# Patient Record
Sex: MALE | Race: BLACK OR AFRICAN AMERICAN | NOT HISPANIC OR LATINO | Employment: UNEMPLOYED | URBAN - METROPOLITAN AREA
[De-identification: names, ages, dates, MRNs, and addresses within clinical notes are randomized per-mention and may not be internally consistent; named-entity substitution may affect disease eponyms.]

---

## 2020-01-07 ENCOUNTER — HOSPITAL ENCOUNTER (OUTPATIENT)
Dept: OCCUPATIONAL THERAPY | Facility: HOSPITAL | Age: 40
End: 2020-01-08
Attending: INTERNAL MEDICINE | Admitting: INTERNAL MEDICINE

## 2020-01-07 LAB
ABS NEUT (OLG): 6.09 X10(3)/MCL (ref 2.1–9.2)
ALBUMIN SERPL-MCNC: 3.9 GM/DL (ref 3.4–5)
ALBUMIN/GLOB SERPL: 1 {RATIO}
ALP SERPL-CCNC: 92 UNIT/L (ref 45–117)
ALT SERPL-CCNC: 29 UNIT/L (ref 16–61)
AMPHET UR QL SCN: NEGATIVE
APPEARANCE, UA: NORMAL
APTT PPP: 31.3 SEC (ref 23.4–34.9)
AST SERPL-CCNC: 26 UNIT/L (ref 15–37)
BACTERIA SPEC CULT: ABNORMAL /HPF
BARBITURATE SCN PRESENT UR: NEGATIVE
BASOPHILS # BLD AUTO: 0.06 X10(3)/MCL (ref 0–0.2)
BASOPHILS NFR BLD AUTO: 0.6 % (ref 0–0.9)
BENZODIAZ UR QL SCN: ABNORMAL
BILIRUB SERPL-MCNC: 0.2 MG/DL (ref 0.2–1)
BILIRUB UR QL STRIP: NORMAL
BILIRUBIN DIRECT+TOT PNL SERPL-MCNC: 0.1 MG/DL (ref 0–0.2)
BILIRUBIN DIRECT+TOT PNL SERPL-MCNC: 0.1 MG/DL (ref 0–1)
BUN SERPL-MCNC: 6 MG/DL (ref 7–18)
CALCIUM SERPL-MCNC: 9 MG/DL (ref 8.5–10.1)
CANNABINOIDS UR QL SCN: ABNORMAL
CHLORIDE SERPL-SCNC: 108 MMOL/L (ref 98–107)
CO2 SERPL-SCNC: 25 MMOL/L (ref 21–32)
COCAINE UR QL SCN: NEGATIVE
COLOR UR: NORMAL
CREAT SERPL-MCNC: 0.86 MG/DL (ref 0.7–1.3)
EOSINOPHIL # BLD AUTO: 0.25 X10(3)/MCL (ref 0–0.9)
EOSINOPHIL NFR BLD AUTO: 2.4 % (ref 0–6.5)
ERYTHROCYTE [DISTWIDTH] IN BLOOD BY AUTOMATED COUNT: 14.5 % (ref 11.5–17)
GLOBULIN SER-MCNC: 4 GM/DL (ref 2–4)
GLUCOSE (UA): NORMAL
GLUCOSE SERPL-MCNC: 88 MG/DL (ref 74–106)
HCT VFR BLD AUTO: 41.5 % (ref 42–52)
HGB BLD-MCNC: 14 GM/DL (ref 14–18)
HGB UR QL STRIP: NORMAL
IMM GRANULOCYTES # BLD AUTO: 0.05 10*3/UL (ref 0–0.02)
IMM GRANULOCYTES NFR BLD AUTO: 0.5 % (ref 0–0.43)
INR PPP: 1 (ref 2–3)
KETONES UR QL STRIP: NORMAL
LEUKOCYTE ESTERASE UR QL STRIP: NORMAL
LYMPHOCYTES # BLD AUTO: 3.04 X10(3)/MCL (ref 0.6–4.6)
LYMPHOCYTES NFR BLD AUTO: 28.7 % (ref 16.2–38.3)
MCH RBC QN AUTO: 27 PG (ref 27–31)
MCHC RBC AUTO-ENTMCNC: 33.7 GM/DL (ref 33–36)
MCV RBC AUTO: 80 FL (ref 80–94)
METHADONE UR QL SCN: NEGATIVE
MONOCYTES # BLD AUTO: 1.09 X10(3)/MCL (ref 0.1–1.3)
MONOCYTES NFR BLD AUTO: 10.3 % (ref 4.7–11.3)
NEUTROPHILS # BLD AUTO: 6.09 X10(3)/MCL (ref 2.1–9.2)
NEUTROPHILS NFR BLD AUTO: 57.5 % (ref 49.1–73.4)
NITRITE UR QL STRIP: NORMAL
NRBC BLD AUTO-RTO: 0 % (ref 0–0.2)
OPIATES UR QL SCN: NEGATIVE
PCP UR QL: NEGATIVE
PH UR STRIP.AUTO: 5 [PH] (ref 5–7.5)
PH UR STRIP: NORMAL [PH] (ref 5–7)
PLATELET # BLD AUTO: 281 X10(3)/MCL (ref 130–400)
PMV BLD AUTO: 12.2 FL (ref 7.4–10.4)
POTASSIUM SERPL-SCNC: 3.4 MMOL/L (ref 3.5–5.1)
PROT SERPL-MCNC: 8.4 GM/DL (ref 6.4–8.2)
PROT UR QL STRIP: NORMAL
PROTHROMBIN TIME: 12.6 SEC (ref 11.7–14.5)
RBC # BLD AUTO: 5.19 X10(6)/MCL (ref 4.7–6.1)
RBC #/AREA URNS HPF: ABNORMAL /HPF
SODIUM SERPL-SCNC: 139 MMOL/L (ref 136–145)
SP GR UR STRIP: NORMAL (ref 1–1.03)
SQUAMOUS EPITHELIAL, UA: ABNORMAL /LPF
UROBILINOGEN UR STRIP-ACNC: NORMAL
WBC # SPEC AUTO: 10.6 X10(3)/MCL (ref 4.5–11.5)
WBC #/AREA URNS HPF: ABNORMAL /HPF

## 2020-01-08 LAB
ABS NEUT (OLG): 6.97 X10(3)/MCL (ref 2.1–9.2)
APTT PPP: 33 SECOND(S) (ref 24.2–33.9)
BASOPHILS # BLD AUTO: 0.1 X10(3)/MCL (ref 0–0.2)
BASOPHILS NFR BLD AUTO: 1 %
BUN SERPL-MCNC: 7 MG/DL (ref 7–18)
CALCIUM SERPL-MCNC: 8.6 MG/DL (ref 8.5–10.1)
CHLORIDE SERPL-SCNC: 110 MMOL/L (ref 98–107)
CO2 SERPL-SCNC: 26 MMOL/L (ref 21–32)
CREAT SERPL-MCNC: 0.83 MG/DL (ref 0.7–1.3)
CREAT/UREA NIT SERPL: 8.4
EOSINOPHIL # BLD AUTO: 0.3 X10(3)/MCL (ref 0–0.9)
EOSINOPHIL NFR BLD AUTO: 3 %
ERYTHROCYTE [DISTWIDTH] IN BLOOD BY AUTOMATED COUNT: 14.9 % (ref 11.5–17)
GLUCOSE SERPL-MCNC: 92 MG/DL (ref 74–106)
HCT VFR BLD AUTO: 41 % (ref 42–52)
HGB BLD-MCNC: 13.2 GM/DL (ref 14–18)
IMM GRANULOCYTES # BLD AUTO: 0 10*3/UL
IMM GRANULOCYTES NFR BLD AUTO: 1 %
INR PPP: 0.9 (ref 0–1.3)
LYMPHOCYTES # BLD AUTO: 2.1 X10(3)/MCL (ref 0.6–4.6)
LYMPHOCYTES NFR BLD AUTO: 20 %
MCH RBC QN AUTO: 26.6 PG (ref 27–31)
MCHC RBC AUTO-ENTMCNC: 32.2 GM/DL (ref 33–36)
MCV RBC AUTO: 82.5 FL (ref 80–94)
MONOCYTES # BLD AUTO: 1.1 X10(3)/MCL (ref 0.1–1.3)
MONOCYTES NFR BLD AUTO: 10 %
NEUTROPHILS # BLD AUTO: 6.97 X10(3)/MCL (ref 2.1–9.2)
NEUTROPHILS NFR BLD AUTO: 66 %
PLATELET # BLD AUTO: 273 X10(3)/MCL (ref 130–400)
PMV BLD AUTO: 11.8 FL (ref 9.4–12.4)
POTASSIUM SERPL-SCNC: 4 MMOL/L (ref 3.5–5.1)
PROTHROMBIN TIME: 12.6 SECOND(S) (ref 12–14)
RBC # BLD AUTO: 4.97 X10(6)/MCL (ref 4.7–6.1)
SODIUM SERPL-SCNC: 140 MMOL/L (ref 136–145)
WBC # SPEC AUTO: 10.6 X10(3)/MCL (ref 4.5–11.5)

## 2020-07-19 ENCOUNTER — HISTORICAL (OUTPATIENT)
Dept: ADMINISTRATIVE | Facility: HOSPITAL | Age: 40
End: 2020-07-19

## 2021-03-28 ENCOUNTER — HISTORICAL (OUTPATIENT)
Dept: ADMINISTRATIVE | Facility: HOSPITAL | Age: 41
End: 2021-03-28

## 2021-04-16 ENCOUNTER — HISTORICAL (OUTPATIENT)
Dept: ADMINISTRATIVE | Facility: HOSPITAL | Age: 41
End: 2021-04-16

## 2021-11-20 ENCOUNTER — HISTORICAL (OUTPATIENT)
Dept: ADMINISTRATIVE | Facility: HOSPITAL | Age: 41
End: 2021-11-20

## 2022-04-10 ENCOUNTER — HISTORICAL (OUTPATIENT)
Dept: ADMINISTRATIVE | Facility: HOSPITAL | Age: 42
End: 2022-04-10

## 2022-04-29 VITALS
BODY MASS INDEX: 25.79 KG/M2 | DIASTOLIC BLOOD PRESSURE: 98 MMHG | WEIGHT: 170.19 LBS | OXYGEN SATURATION: 98 % | BODY MASS INDEX: 25.79 KG/M2 | HEIGHT: 68 IN | WEIGHT: 170.19 LBS | HEIGHT: 68 IN | SYSTOLIC BLOOD PRESSURE: 158 MMHG

## 2022-04-30 NOTE — ED PROVIDER NOTES
Patient:   Obinna Burton             MRN: [id number]            FIN: [id number]               Age:   39 years     Sex:  Male     :  1980   Associated Diagnoses:   Hematuria, gross; H/O hemophilia B   Author:   Ayana Ribera MD      Basic Information   Additional information: Chief Complaint from Nursing Triage Note : Chief Complaint   2020 18:19 CST       Chief Complaint           c/o abdominal discomfort since this am accompanied by bloody urine since yesterday, pt is hemophilia B pt, states took factor medication x2 today  (Modified) .      History of Present Illness   The patient presents with hematuria, Mr. Senegal is a 39-year-old -American male with a history of hemophilia B and hypertension complaining of hematuria which started yesterday.  Today he developed suprapubic aching, cramping sensations and is now urinating blood with small blood clots.  He took 2 doses of his factor IX product today but the bleeding did not resolve.  He has no fever, dysuria, penile discharge, history of UTI, history of STD.  He has had multiple complications due to his hemophilia over the years including recurrent episodes of hemarthrosis and also a previous episode of intra-abdominal bleeding which required surgery and has a large midline scar.  However, he's not had any complications for the last few years.  He usually takes his factor IX shot twice a week on Monday and Thursday, 6000 units, but took 2 doses today due to the continued urinary bleeding..  The onset was 2  days ago.  The course/duration of symptoms is constant.  Character of hematuria grossly bloody.  The degree at onset was moderate.  The degree at present is severe.  The exacerbating factor is none.  The relieving factor is none.  Prior episodes: rare.  Therapy today: Factor 9 X two doses today.  Associated symptoms: abdominal pain.        Review of Systems   Gastrointestinal symptoms:  Abdominal pain.   Genitourinary symptoms:   Hematuria.             Additional review of systems information: All other systems reviewed and otherwise negative.      Health Status   Allergies:    Allergic Reactions (Selected)  Severity Not Documented  Acetaminophen- Thinins blood.  Aspirin- Other.,    Allergies (2) Active Reaction  acetaminophen Thinins blood  aspirin Other  .   Medications:  (Selected)   Prescriptions  Prescribed  Remeron 30 mg oral tablet: 30 mg = 1 tab(s), Oral, Once a day (at bedtime), # 30 tab(s), 0 Refill(s), Pharmacy: [facility]  amlodipine 5 mg oral tablet: 5 mg = 1 tab(s), Oral, Daily, # 30 tab(s), 5 Refill(s), Pharmacy: [facility]  valsartan 160 mg oral tablet: 0.5 tab, Oral, Daily, # 30 tab(s), 5 Refill(s), Pharmacy: [facility]  Documented Medications  Documented  BeneFIX intravenous kit:   BeneFIX intravenous kit:   azithromycin 250 mg oral tablet: Oral  cetirizine 10 mg oral tablet: 10 mg = 1 tab(s), Oral, Daily  diclofenac sodium 75 mg oral delayed release tablet: 75 mg = 1 tab(s), Oral, BID  miSOPROStol 200 mcg oral tablet: 200 mcg = 1 tab(s), Oral, BID  ranitidine 150 mg oral tablet: 150 mg = 1 tab(s), Oral, BID  traZODone 100 mg oral tablet: 100 mg = 1 tab(s), Oral, qPM.      Past Medical/ Family/ Social History   Medical history: Negative.   Surgical history:    Knee ([id number]).  Comments:  12/18/2019 14:52 CST - Babita Pereira  both knees  Stomach ([id number]).  Comments:  12/18/2019 14:52 CST - Babita Pereira  removed blood clot, Reviewed as documented in chart.   Family history:    Hypertension.  Father  Mother  , Reviewed as documented in chart.   Social history: Tobacco use: Denies.   Problem list:    Active Problems (7)  Anxiety   GERD - Gastro-esophageal reflux disease   Hemophilia   HTN - Hypertension   Insomnia   Obesity   Seasonal allergy   , per nurse's notes.      Physical Examination               Vital Signs   Vital Signs   1/7/2020 18:19 CST       Temperature Temporal Artery               36.9 DegC                              Peripheral Pulse Rate     88 bpm                             Respiratory Rate          18 br/min                             SpO2                      99 %                             Oxygen Therapy            Room air                             Systolic Blood Pressure   121 mmHg                             Diastolic Blood Pressure  78 mmHg  .   Measurements   1/7/2020 18:19 CST       Weight Dosing             94 kg                             Weight Measured and Calculated in Lbs     207.23 lb                             Weight Estimated          94 kg                             Height/Length Dosing      172 cm                             Height/Length Estimated   172 cm                             Body Mass Index Estimated 31.77 kg/m2  .   Oxygen saturation.   General:  Alert, no acute distress.    Skin:  Warm, dry.    Eye:  Pupils are equal, round and reactive to light.   Cardiovascular:  Regular rate and rhythm.   Respiratory:  Lungs are clear to auscultation.   Gastrointestinal:  Soft, Nontender, Non distended, Normal bowel sounds, No organomegaly, Mass: Negative.    Musculoskeletal:  Ambulatory without assistance.   Neurological:  Alert and oriented to person, place, time, and situation, No focal neurological deficit observed.    Psychiatric:  Cooperative, appropriate mood & affect.       Medical Decision Making   Documents reviewed:  Emergency department nurses' notes.   Results review:  Lab results : Lab View   1/7/2020 18:38 CST       Sodium Lvl                139 mmol/L                             Potassium Lvl             3.4 mmol/L  LOW                             Chloride                  108 mmol/L  HI                             CO2                       25.0 mmol/L                             Calcium Lvl               9.0 mg/dL                             Glucose Lvl               88 mg/dL                             BUN                       6.0 mg/dL  LOW                              Creatinine                0.86 mg/dL                             eGFR-AA                   >60 mL/min/1.73 m2  NA                             eGFR-CONSTANZA                  >60 mL/min/1.73 m2  NA                             Bili Total                0.2 mg/dL                             Bili Direct               0.10 mg/dL                             Bili Indirect             0.10 mg/dL                             AST                       26 unit/L                             ALT                       29 unit/L                             Alk Phos                  92 unit/L                             Total Protein             8.4 gm/dL  HI                             Albumin Lvl               3.9 gm/dL                             Globulin                  4 gm/dL                             A/G Ratio                 1.0  NA                             PT                        12.6 sec                             INR                       1.0  LOW                             WBC                       10.6 x10(3)/mcL                             RBC                       5.19 x10(6)/mcL                             Hgb                       14.0 gm/dL                             Hct                       41.5 %  LOW                             Platelet                  281 x10(3)/mcL                             MCV                       80.0 fL                             MCH                       27.0 pg                             MCHC                      33.7 gm/dL                             RDW                       14.5 %                             MPV                       12.2 fL  HI                             Abs Neut                  6.09 x10(3)/mcL                             Neutro Auto               57.5 %                             Lymph Auto                28.7 %                             Mono Auto                 10.3 %                             Eos Auto                  2.4 %                              Abs Eos                   0.25 x10(3)/mcL                             Basophil Auto             0.6 %                             Abs Neutro                6.09 x10(3)/mcL                             Abs Lymph                 3.04 x10(3)/mcL                             Abs Mono                  1.09 x10(3)/mcL                             Abs Baso                  0.06 x10(3)/mcL                             NRBC%                     0.0 %                             IG%                       0.500 %  HI                             IG#                       0.0500  HI    1/7/2020 18:23 CST       U pH                      5.0                             UA Appear                 CLOUDY                             UA Color                  RED                             UA Spec Grav              NP                             UA Bili                   NP                             UA pH                     NP                             UA Urobilinogen           NP                             UA Blood                  NP                             UA Glucose                NP                             UA Ketones                NP                             UA Protein                NP                             UA Nitrite                NP                             UA Leuk Est               NP                             UA WBC                    0-1 /HPF                             UA RBC                    TNTC /HPF                             UA Bacteria               Few /HPF                             UA Squam Epithelial       Moderate /LPF                             U Amph Scr                Negative                             U Pau Scr                Negative                             U Benzodia Scr            POS, Unconfirmed                             U Cannab Scr              POS, Unconfirmed                             U Cocaine Scr             Negative                              U Opiate Scr              Negative                             U Phencyclidine Scr       Negative                             U Methadone               Negative  .   Radiology results:  Rad Results (ST)  < 12 hrs   Accession: [id number]  Order: CT Abdomen and Pelvis W Contrast  Report Dt/Tm: 01/07/2020 20:05  Report:   CT of the abdomen pelvis with contrast     [id number]     INDICATION: Blood in urine     TECHNIQUE: Routine CT of the abdomen pelvis performed with intravenous  contrast.     Automatic exposure control was utilized to reduce patient's radiation  dose.     Total DLP: 1212.2 mGy.cm      FINDINGS: The visualized lung bases are clear.     The spleen, pancreas, contracted gallbladder, and adrenal glands  unremarkable. The liver demonstrates focal fatty infiltration in the  medial left hepatic lobe. The abdominal aorta is normal caliber. Both  kidneys enhance normally. No hydronephrosis or perinephric collection.     The appendix appears normal. There is no bowel obstruction or bowel  inflammation. Bladder contours are normal. No free air, free fluid,  fluid collection.     IMPRESSION: No acute intra-abdominal intrapelvic abnormality.      .      Reexamination/ Reevaluation   Time: 1/7/2020 20:35:00 .   Vital signs   Basic Oxygen Information   1/7/2020 18:19 CST       SpO2                      99 %                             Oxygen Therapy            Room air     Notes: Labs and CT scan are benign, last urinated red bloody urine with small clots, states he still having suprapubic cramping..      Impression and Plan   Diagnosis   Hematuria, gross (FSI50-SR R31.0)   H/O hemophilia B ([id number] Z86.2)      Calls-Consults   -  Case discussed with [facility] nurse practitioner.  She recommends we place him in outpatient observation to Dr. Perez, and if he is continuing to bleed by the morning, hematology will be consulted for further evaluation and management..   Plan   Condition: Stable.    Disposition: Admit  time  1/7/2020 21:03:00, Place in Observation Unit.    Counseled: Patient, Regarding diagnosis, Regarding diagnostic results, Regarding treatment plan, Patient indicated understanding of instructions.

## 2022-04-30 NOTE — DISCHARGE SUMMARY
Patient:   Obinna Burton             MRN: [id number]            FIN: [id number]               Age:   39 years     Sex:  Male     :  1980   Associated Diagnoses:   None   Author:   Paulino Salomon MD      Discharge Information      Discharge Summary Information   Admit/Discharge Dates   Admit Date: 2020  Discharge Date: 2020     Physicians   Attending Physician - Chris HIDALGO, Geraldo PLAZA  Admitting Physician - Chris HIDALGO, Geraldo PLAZA  Consulting Physician - Chris HIDALGO, Geraldo PLAZA  Consulting Physician - Aime HIDALGO, Paulino  Primary Care Physician - Sari Hall     Discharge Diagnosis   Spontaneous lola hematuria: resolved   Hemophilia B  Essential hypertension     Procedures   No procedures recorded for this visit.   Immunizations   No immunizations recorded for this visit.     Discharge Medications   Continue  amLODIPine (amlodipine 5 mg oral tablet) 5 mg, Oral, Daily  coagulation factor IX (BeneFIX intravenous kit)  coagulation factor IX (BeneFIX intravenous kit)  mirtazapine (Remeron 30 mg oral tablet) 30 mg, Oral, Once a day (at bedtime)  valsartan (valsartan 160 mg oral tablet) 0.5 tab, Oral, Daily  Discontinue  azithromycin (azithromycin 250 mg oral tablet), Oral  cetirizine (cetirizine 10 mg oral tablet) 10 mg, Oral, Daily  diclofenac (diclofenac sodium 75 mg oral delayed release tablet) 75 mg, Oral, BID  misoprostol (miSOPROStol 200 mcg oral tablet) 200 mcg, Oral, BID  ranitidine (ranitidine 150 mg oral tablet) 150 mg, Oral, BID  traZODone (traZODone 100 mg oral tablet) 100 mg, Oral, qPM        Education   Hemophilia, Adult  Discharge - 20 8:02:00 CST, Home, Give all scheduled vaccinations prior to discharge.   Discharge Activity - Activity as Tolerated   Discharge Diet - Regular         Followup   Sari Cooper, on 2020        Hospital Course   Hospital Course   This is a 39-year-old male with medical history of essential hypertension and hemophilia B present with  complaint of 1 day history of hematuria and lower abdominal discomfort. Report that yesterday he started having lower abdominal cramping followed by gross hematuria and burning sensation with urination. Report the urine has cleared up throughout the day, but again this morning he started to have the same episode. This prompted him to take twice his usual factor IX dose. He normally take 6000 IU on Monday and Thursday. Denies flank or back pain, no abdominal/back trauma, no fever or chills. Report while in the ER he started passing a lot of clots, no the urine started to clear up and became pink.    Of note, he follow with his hematologist in [facility]. He was on factor IX replacement with AlphaNine, until 4 months ago was changed to BeneFIX.    On arrival to ED, was hemodynamically stable and afebrile. Labs unremarkable, PTT 31.3, INR 1, platelet 281. UA gross blood with few bacteria. U tox positive for cannabis and benzo. CT abdomen and pelvis show no acute intra-abdominal or intrapelvic abnormality both kidneys enhance normally with no hydronephrosis or perinephric collection and a normal bladder contours. Referred to hospitalist medicine service for further evaluation and management.  Pt was hydrated with iv NS. He had no issues overnight. His hematuria completely resolved. He denied fever, chills, dysuria or abdominal pain. He will be discharge home. Advised that if he gets frequent episodes of hematuria he will have to f/u with a urologist.   Explained in detail to patient and family about the discharge plan, medications and F/U visits. They understand agree with the treatment plan.   Condition stable  Diet: Cardiac   Meds per dc med rec  Activities as tolerated   F/U with PCP in 1 to 2 weeks  For further questions contact hospitalist office  DC 31 mts .        Physical Examination   General:  Alert and oriented, No acute distress.    Respiratory:  Lungs are clear to auscultation, Breath sounds are equal.     Cardiovascular:  Normal rate, Regular rhythm, No murmur.    Gastrointestinal:  Soft, Non-tender, Non-distended, Normal bowel sounds.    Integumentary:  Warm, No pallor, No rash.    Neurologic:  No focal deficits, Cranial Nerves II-XII are grossly intact.       Results Review   General results   Today's results   1/8/2020 6:24 CST        WBC                       10.6 x10(3)/mcL                             RBC                       4.97 x10(6)/mcL                             Hgb                       13.2 gm/dL  LOW                             Hct                       41.0 %  LOW                             Platelet                  273 x10(3)/mcL                             MCV                       82.5 fL                             MCH                       26.6 pg  LOW                             MCHC                      32.2 gm/dL  LOW                             RDW                       14.9 %                             MPV                       11.8 fL                             Abs Neut                  6.97 x10(3)/mcL                             Neutro Auto               66 %  NA                             Lymph Auto                20 %  NA                             Mono Auto                 10 %  NA                             Eos Auto                  3 %  NA                             Abs Eos                   0.3 x10(3)/mcL                             Basophil Auto             1 %  NA                             Abs Neutro                6.97 x10(3)/mcL                             Abs Lymph                 2.1 x10(3)/mcL                             Abs Mono                  1.1 x10(3)/mcL                             Abs Baso                  0.1 x10(3)/mcL                             IG%                       1  NA                             IG#                       0  NA                             PT                        12.6 second(s)                             INR                        0.9                             PTT                       33.0 second(s)                             Sodium Lvl                140 mmol/L                             Potassium Lvl             4.0 mmol/L                             Chloride                  110 mmol/L  HI                             CO2                       26.0 mmol/L                             Calcium Lvl               8.6 mg/dL                             Glucose Lvl               92 mg/dL                             BUN                       7.0 mg/dL                             Creatinine                0.83 mg/dL                             BUN/Creat Ratio           8.4  NA                             eGFR-AA                   >60 mL/min/1.73 m2  NA                             eGFR-CONSTANZA                  >60 mL/min/1.73 m2  NA        Discharge Plan   Education and Follow-up   Counseled: patient, regarding diagnosis, regarding treatment, regarding medications.

## 2022-05-02 NOTE — HISTORICAL [facility] CERNER
This is a historical note converted from Agatha. Formatting and pictures may have been removed.  Please reference Agatha for original formatting and attached multimedia. Chief Complaint  Referral for Rt hand Fx.  History of Present Illness  This is a right-hand-dominant 40-year-old male that sustained a right fifth metacarpal fracture 1 week ago when he fell?onto his?closed hand. ?He says he has had some swelling that has improved since his injury, otherwise has not hurt anything else.? Of note, his medical history is significant for hemophilia and he received 2 infusions?weekly.  Review of Systems  Denies paresthesias or numbness  Physical Exam  Vitals & Measurements  HT:?172.00?cm? WT:?77.200?kg? BMI:?26.1?  Right upper extremity  Soft tissue swelling over?fifth metacarpal  Tenderness palpation over fifth metacarpal head  Some shortening of the fifth?MCP joint,?no extensor lag,?no palm deformity, no malrotation on?handgrip  Sensation tact light touch over radial and ulnar aspect of all digits  No anatomic snuffbox tenderness  No wrist pain  ?  Independent review of radiographs shows 5 metacarpal?neck fracture  Assessment/Plan  Hand fracture, right?S62.91XA  ?Right-hand-dominant 40-year-old male with 5th metacarpal hand fracture  We discussed that this can be treated nonoperatively,?he does not have any malrotation, extensor lag, palm deformity that would?meet indication for surgery, nor does he want to have operative intervention for this.? We discussed that he will likely have a?shortening?of that fifth?metacarpal, as well as some palpable?deformity even after this heals which she was okay with.? We will put him in a?removable?ulnar?brace,?see him back in?3 weeks with a repeat x-ray and likely discharge him at that time. ?Nonweightbearing to that hand until then.  ?  Dr. Jaycee Serna  ?  Obinna Burton?was evaluated at the time of the encounter with?Dr Serna.? HPI, PE and treatment plan was reviewed.? Treatment  plan was reasonable and necessary.??Imaging was reviewed at the time of visit.??  ?  metacarpal fx  ?  ?  Ordered:  XR Hand Right Minimum 3 Views, Routine, 04/16/21 9:34:00 CDT, Fracture, fracture, None, Ambulatory, Rad Type, Hand fracture, right, Not Scheduled, 04/16/21 9:34:00 CDT  ?   Medications  alPRAzolam 1 mg oral tablet, 1 mg= 1 tab(s), Oral, q8hr,? ?Not Taking, Completed Rx  amitriptyline 25 mg oral tablet, 25 mg= 1 tab(s), Oral, Once a day (at bedtime), 2 refills  amlodipine 5 mg oral tablet, 5 mg= 1 tab(s), Oral, Daily, 5 refills  azithromycin 250 mg oral tablet, Oral,? ?Not Taking, Completed Rx  BeneFIX intravenous kit  cetirizine 10 mg oral tablet, 10 mg= 1 tab(s), Oral, qAM,? ?Not Taking, Completed Rx  cholecalciferol 5000 intl units (125 mcg) oral capsule, 5000 IntUnit= 1 cap(s), Oral, Daily,? ?Not Taking, Completed Rx  clonazePAM 0.5 mg oral tablet, 0.5 mg= 1 tab(s), Oral, q8hr  codeine-promethazine 10 mg-6.25 mg/5 mL oral syrup, 5 mL, Oral, q8hr,? ?Not Taking, Completed Rx  dextromethorphan-promethazine 15 mg-6.25 mg/5 mL oral syrup, 5 mL, Oral, q8hr,? ?Not Taking, Completed Rx  levoFLOXacin 500 mg oral tablet, 500 mg= 1 tab(s), Oral, Daily,? ?Not Taking, Completed Rx  methylPREDNISolone 4 mg oral tab,? ?Not Taking, Completed Rx  montelukast 10 mg oral TABLET, 10 mg= 1 tab(s), Oral, qPM,? ?Not Taking, Completed Rx

## 2022-05-02 NOTE — HISTORICAL [facility]
This is a historical note converted from Agatha. Formatting and pictures may have been removed.  Please reference Agatha for original formatting and attached multimedia. Chief Complaint  Hematuria  History of Present Illness  This is?a 39-year-old male with medical history of?essential hypertension?and hemophilia B?present?with?complaint of 1 day history of?hematuria?and lower abdominal discomfort. ?Report?that yesterday?he started having?lower abdominal?cramping?followed by?gross hematuria and burning?sensation?with urination. ??Report?the urine?has?cleared up throughout the day, but again this?morning?he started to have the same episode. ?This prompted him to take?twice his?usual factor IX dose. He normally take 6000 IU on Monday?and Thursday. Denies?flank or back pain, no abdominal/back trauma,?no fever or chills. ?Report while in the ER he started passing a lot of clots, no the urine started to clear up and became pink.  ?  Of note, he [facility]. ?He was on?factor IX replacement with AlphaNine, until 4 months ago was changed to BeneFIX.  ?  On arrival to ED, was hemodynamically stable and afebrile. Labs unremarkable, PTT 31.3, INR 1, platelet 281.? UA gross blood with few bacteria.??U tox positive for cannabis and benzo. CT abdomen and pelvis show no acute intra-abdominal or intrapelvic abnormality both kidneys enhance normally with no hydronephrosis or perinephric collection and a normal bladder contours. ?Referred to hospitalist medicine service for further evaluation and management.  Review of Systems  Except as documented, all other systems reviewed and are negative.  Physical Exam  Vitals & Measurements  T:?36.9? ?C (Oral)? HR:?84(Monitored)? RR:?17? BP:?128/89? SpO2:?99%? WT:?94?kg?  General: appears comfortable  HEENT:?NC/AT  Neck:?no JVD, trachea at?midline  Chest:?CTABL, no rales or rhonchi, no accessory muscle use  CVS:?regular rhythm. Normal S1/S2. No gallops, murmurs or  rub.  Abdomen:?nondistended, normoactive bowel sound, soft and non-tender.  Extremities:?no clubbing or cyanosis  Skin:?warm and dry  Psych:?cooperative  Assessment/Plan  Spontaneous?lola hematuria  Hemophilia B  Bacteriuria/possible cystitis UTI?  Essential hypertension  ?  Plan:  ?  -Patient already status post 12,000 IU?factor IX today,?PTT WNL  -IV hydration with 150 ml/hr  -Empiric ceftriaxone 1g q24h  -Resume valsartan and amlodipine  -Protonix 40mg daily  -TT,PTT,PT and CBC in AM -- if PTT prolonged will get inhibitor study and consult hematology & urology  ?  Code status: Full  PCP: Sari Cooper FNP   Problem List/Past Medical History  Essential hypertension  Hemophilia B  Bilateral hemarthrosis  Procedure/Surgical History  Knee  Stomach   Medications  Inpatient  ceftriaxone (for IVPB)  Sodium Chloride 0.9% 1,000 mL, 1000 mL, IV, Once-NOW  valsartan, 80 mg= 1 tab(s), Oral, BID  Home  amlodipine 5 mg oral tablet, 5 mg= 1 tab(s), Oral, Daily, 5 refills  BeneFIX intravenous kit  valsartan 160 mg oral tablet, 0.5 tab, Oral, Daily, 5 refills  Allergies  acetaminophen?(Thinins blood)  aspirin?(Other)  Social History  Abuse/Neglect  No, No, No, 01/07/2020  Alcohol  Current, 1-2 times per week, 12/18/2019  Tobacco  5-9 cigarettes (between 1/4 to 1/2 pack)/day in last 30 days, No, 01/07/2020  ??? Smokes marijuana  Family History  Hypertension.: Mother and Father.  Lab Results  Labs Last 24 Hours?  ?Chemistry? Hematology/Coagulation?   Sodium Lvl: 139 mmol/L (01/07/20 18:38:00) PT: 12.6 sec (01/07/20 18:38:00)   Potassium Lvl:?3.4 mmol/L?Low (01/07/20 18:38:00) INR:?1?Low (01/07/20 18:38:00)   Chloride:?108 mmol/L?High (01/07/20 18:38:00) PTT: 31.3 sec (01/07/20 18:38:00)   CO2: 25 mmol/L (01/07/20 18:38:00) WBC: 10.6 x10(3)/mcL (01/07/20 18:38:00)   Calcium Lvl: 9 mg/dL (01/07/20 18:38:00) RBC: 5.19 x10(6)/mcL (01/07/20 18:38:00)   Glucose Lvl: 88 mg/dL (01/07/20 18:38:00) Hgb: 14 gm/dL (01/07/20 18:38:00)   BUN:?6  mg/dL?Low (01/07/20 18:38:00) Hct:?41.5 %?Low (01/07/20 18:38:00)   Creatinine: 0.86 mg/dL (01/07/20 18:38:00) Platelet: 281 x10(3)/mcL (01/07/20 18:38:00)   eGFR-AA: >60 (01/07/20 18:38:00) MCV: 80 fL (01/07/20 18:38:00)   eGFR-CONSTANZA: >60 (01/07/20 18:38:00) MCH: 27 pg (01/07/20 18:38:00)   Bili Total: 0.2 mg/dL (01/07/20 18:38:00) MCHC: 33.7 gm/dL (01/07/20 18:38:00)   Bili Direct: 0.1 mg/dL (01/07/20 18:38:00) RDW: 14.5 % (01/07/20 18:38:00)   Bili Indirect: 0.1 mg/dL (01/07/20 18:38:00) MPV:?12.2 fL?High (01/07/20 18:38:00)   AST: 26 unit/L (01/07/20 18:38:00) Abs Neut: 6.09 x10(3)/mcL (01/07/20 18:38:00)   ALT: 29 unit/L (01/07/20 18:38:00) Neutro Auto: 57.5 % (01/07/20 18:38:00)   Alk Phos: 92 unit/L (01/07/20 18:38:00) Lymph Auto: 28.7 % (01/07/20 18:38:00)   Total Protein:?8.4 gm/dL?High (01/07/20 18:38:00) Mono Auto: 10.3 % (01/07/20 18:38:00)   Albumin Lvl: 3.9 gm/dL (01/07/20 18:38:00) Eos Auto: 2.4 % (01/07/20 18:38:00)   Globulin: 4 gm/dL (01/07/20 18:38:00) Abs Eos: 0.25 x10(3)/mcL (01/07/20 18:38:00)   A/G Ratio: 1 (01/07/20 18:38:00) Basophil Auto: 0.6 % (01/07/20 18:38:00)   U pH: 5 (01/07/20 18:23:00) Abs Neutro: 6.09 x10(3)/mcL (01/07/20 18:38:00)    Abs Lymph: 3.04 x10(3)/mcL (01/07/20 18:38:00)    Abs Mono: 1.09 x10(3)/mcL (01/07/20 18:38:00)    Abs Baso: 0.06 x10(3)/mcL (01/07/20 18:38:00)    NRBC%: 0.0 (01/07/20 18:38:00)    IG%:?0.5 %?High (01/07/20 18:38:00)    IG#:?0.05?High (01/07/20 18:38:00)   Diagnostic Results  Accession:?[id number]  Order:?CT Abdomen and Pelvis W Contrast  Report Dt/Tm:?01/07/2020 20:05  Report:?  CT of the abdomen pelvis with contrast  ?  [id number]  ?  INDICATION: Blood in urine  ?  TECHNIQUE: Routine CT of the abdomen pelvis performed with intravenous  contrast.  ?  Automatic exposure control was utilized to reduce patients radiation  dose.  ?  Total DLP: 1212.2 mGy.cm?  ?  FINDINGS: The visualized lung bases are clear.  ?  The spleen, pancreas, contracted gallbladder,  and adrenal glands  unremarkable. The liver demonstrates focal fatty infiltration in the  medial left hepatic lobe. The abdominal aorta is normal caliber. Both  kidneys enhance normally. No hydronephrosis or perinephric collection.  ?  The appendix appears normal. There is no bowel obstruction or bowel  inflammation. Bladder contours are normal. No free air, free fluid,  fluid collection.  ?  IMPRESSION: No acute intra-abdominal intrapelvic abnormality.

## 2022-10-26 ENCOUNTER — HISTORICAL (OUTPATIENT)
Dept: ADMINISTRATIVE | Facility: HOSPITAL | Age: 42
End: 2022-10-26

## 2023-02-17 VITALS
HEIGHT: 68 IN | WEIGHT: 170.88 LBS | OXYGEN SATURATION: 98 % | TEMPERATURE: 98 F | SYSTOLIC BLOOD PRESSURE: 118 MMHG | BODY MASS INDEX: 25.9 KG/M2 | HEART RATE: 90 BPM | DIASTOLIC BLOOD PRESSURE: 64 MMHG

## 2023-02-17 RX ORDER — MIRTAZAPINE 7.5 MG/1
7.5 TABLET, FILM COATED ORAL DAILY
COMMUNITY
Start: 2022-07-14

## 2023-02-17 RX ORDER — IRBESARTAN 150 MG/1
150 TABLET ORAL DAILY
COMMUNITY
Start: 2022-08-23 | End: 2023-04-25 | Stop reason: SDUPTHER

## 2023-02-17 RX ORDER — LEVETIRACETAM 500 MG/1
500 TABLET ORAL 2 TIMES DAILY
COMMUNITY
Start: 2022-02-11 | End: 2023-03-29

## 2023-02-17 RX ORDER — AMLODIPINE BESYLATE 5 MG/1
5 TABLET ORAL DAILY
COMMUNITY
Start: 2022-01-26 | End: 2023-03-29

## 2023-03-21 ENCOUNTER — TELEPHONE (OUTPATIENT)
Dept: FAMILY MEDICINE | Facility: CLINIC | Age: 43
End: 2023-03-21
Payer: MEDICAID

## 2023-03-21 NOTE — TELEPHONE ENCOUNTER
Called and spoke with patient and he stated that he would go to hospital to do lab work and make appointment to come in and see Sari.

## 2023-03-24 ENCOUNTER — TELEPHONE (OUTPATIENT)
Dept: FAMILY MEDICINE | Facility: CLINIC | Age: 43
End: 2023-03-24
Payer: MEDICAID

## 2023-03-24 NOTE — TELEPHONE ENCOUNTER
Called and spoke with mom and she stated that he told her that he did not have labs until May and I explained to her that he had labs due in Feb. I told her that he can go to Hospital Monday and get them done. She verbalized understanding.

## 2023-03-27 ENCOUNTER — TELEPHONE (OUTPATIENT)
Dept: FAMILY MEDICINE | Facility: CLINIC | Age: 43
End: 2023-03-27
Payer: MEDICAID

## 2023-04-06 ENCOUNTER — LAB VISIT (OUTPATIENT)
Dept: LAB | Facility: HOSPITAL | Age: 43
End: 2023-04-06
Attending: NURSE PRACTITIONER
Payer: MEDICAID

## 2023-04-06 DIAGNOSIS — R74.8 ELEVATED LIVER ENZYMES: Primary | ICD-10-CM

## 2023-04-06 DIAGNOSIS — R74.8 ELEVATED LIVER ENZYMES: ICD-10-CM

## 2023-04-06 PROBLEM — R63.0 POOR APPETITE: Status: ACTIVE | Noted: 2023-04-06

## 2023-04-06 PROBLEM — M36.2 HEMOPHILIC ARTHROPATHY: Status: ACTIVE | Noted: 2023-04-06

## 2023-04-06 PROBLEM — Z86.19 HEPATITIS C VIRUS INFECTION RESOLVED AFTER ANTIVIRAL DRUG THERAPY: Status: ACTIVE | Noted: 2023-04-06

## 2023-04-06 PROBLEM — D66 HEMOPHILIC ARTHROPATHY: Status: ACTIVE | Noted: 2023-04-06

## 2023-04-06 PROBLEM — G40.909 SEIZURE DISORDER: Status: ACTIVE | Noted: 2023-04-06

## 2023-04-06 PROBLEM — K21.9 GASTROESOPHAGEAL REFLUX DISEASE: Status: ACTIVE | Noted: 2023-04-06

## 2023-04-06 PROBLEM — Z78.9 DAILY CONSUMPTION OF ALCOHOL: Status: ACTIVE | Noted: 2023-04-06

## 2023-04-06 PROBLEM — J30.2 SEASONAL ALLERGIES: Status: ACTIVE | Noted: 2023-04-06

## 2023-04-06 PROBLEM — E66.9 OBESITY: Status: ACTIVE | Noted: 2023-04-06

## 2023-04-06 PROBLEM — I10 PRIMARY HYPERTENSION: Status: ACTIVE | Noted: 2023-04-06

## 2023-04-06 PROCEDURE — 87522 HEPATITIS C REVRS TRNSCRPJ: CPT | Mod: 90

## 2023-04-06 PROCEDURE — 86803 HEPATITIS C AB TEST: CPT

## 2023-04-10 ENCOUNTER — TELEPHONE (OUTPATIENT)
Dept: FAMILY MEDICINE | Facility: CLINIC | Age: 43
End: 2023-04-10
Payer: MEDICAID

## 2023-04-10 DIAGNOSIS — R74.8 ELEVATED LIVER ENZYMES: ICD-10-CM

## 2023-04-10 LAB
HBV SURFACE AG SERPL QL IA: NEGATIVE
HBV SURFACE AG SERPL QL IA: NORMAL

## 2023-04-10 NOTE — TELEPHONE ENCOUNTER
----- Message from AILEEN Caballero sent at 4/6/2023  1:22 PM CDT -----  Please inform patient of abnormal results.  Please avoid all tylenol products and alcohol.  Please move up appointment to next week, I do not see anything scheduled until 5/2023.  I have ordered more labs to add on to the blood that was drawn today.  Please notify the lab.  If there is not enough blood, the patient needs to return to the hospital to have it collected.

## 2023-04-10 NOTE — TELEPHONE ENCOUNTER
Emmy said that there is enough blood to add the additional tests. I called to notify pt. He didn't answer and mailbox is full.

## 2023-04-10 NOTE — TELEPHONE ENCOUNTER
I called the lab and spoke with Emmy. She is going to check to see if there is enough blood and call me back. I will call pt after that.

## 2023-04-11 ENCOUNTER — TELEPHONE (OUTPATIENT)
Dept: FAMILY MEDICINE | Facility: CLINIC | Age: 43
End: 2023-04-11
Payer: MEDICAID

## 2023-04-11 NOTE — TELEPHONE ENCOUNTER
Hepatitis C with reflex was sent to [facility] on 04/10/2023. Lab personnel could not give me turn around time for results.

## 2023-04-11 NOTE — TELEPHONE ENCOUNTER
Appointment made for April 24 th per mom request to make sure he has a ride.              ----- Message from AILEEN Caballero sent at 4/6/2023  1:22 PM CDT -----  Please inform patient of abnormal results.  Please avoid all tylenol products and alcohol.  Please move up appointment to next week, I do not see anything scheduled until 5/2023.  I have ordered more labs to add on to the blood that was drawn today.  Please notify the lab.  If there is not enough blood, the patient needs to return to the hospital to have it collected.

## 2023-04-12 LAB — MAYO GENERIC ORDERABLE RESULT: ABNORMAL

## 2023-04-13 LAB — HCV RNA SERPL NAA+PROBE-ACNC: NORMAL K[IU]/ML

## 2023-04-17 ENCOUNTER — TELEPHONE (OUTPATIENT)
Dept: FAMILY MEDICINE | Facility: CLINIC | Age: 43
End: 2023-04-17
Payer: MEDICAID

## 2023-04-17 DIAGNOSIS — B17.10 ACUTE HEPATITIS C VIRUS INFECTION WITHOUT HEPATIC COMA: Primary | ICD-10-CM

## 2023-04-18 ENCOUNTER — TELEPHONE (OUTPATIENT)
Dept: FAMILY MEDICINE | Facility: CLINIC | Age: 43
End: 2023-04-18
Payer: MEDICAID

## 2023-04-18 ENCOUNTER — LAB VISIT (OUTPATIENT)
Dept: LAB | Facility: HOSPITAL | Age: 43
End: 2023-04-18
Attending: NURSE PRACTITIONER
Payer: MEDICAID

## 2023-04-18 DIAGNOSIS — R56.9 SEIZURE: ICD-10-CM

## 2023-04-18 DIAGNOSIS — R76.8 HEPATITIS C ANTIBODY TEST POSITIVE: Primary | ICD-10-CM

## 2023-04-18 DIAGNOSIS — B17.10 ACUTE HEPATITIS C VIRUS INFECTION WITHOUT HEPATIC COMA: ICD-10-CM

## 2023-04-18 PROCEDURE — 87902 NFCT AGT GNTYP ALYS HEP C: CPT | Mod: 90

## 2023-04-18 PROCEDURE — 36415 COLL VENOUS BLD VENIPUNCTURE: CPT

## 2023-04-18 PROCEDURE — 87522 HEPATITIS C REVRS TRNSCRPJ: CPT | Mod: 90

## 2023-04-18 NOTE — TELEPHONE ENCOUNTER
14 tablets will be sent in to last until his appointment on the 24 th of April. Mom notified. GLENNA Lagunas LPN          ----- Message from Cindy Hopper sent at 4/18/2023  1:29 PM CDT -----  Regarding: call back  Pt called, said they was in the middle of moving and lost his medicine for his serizers, says he has appt with Sari on 4/24/23, was wanting to know if he could some to last him till that appt?        Keppra 500 mg oral tablet      [facility]        [phone]

## 2023-04-19 LAB — HCV RNA SERPL NAA+PROBE-ACNC: NORMAL IU/ML

## 2023-04-20 LAB — HCV GENTYP SERPL NAA+PROBE: NORMAL

## 2023-04-21 ENCOUNTER — LAB VISIT (OUTPATIENT)
Dept: LAB | Facility: HOSPITAL | Age: 43
End: 2023-04-21
Attending: NURSE PRACTITIONER
Payer: MEDICAID

## 2023-04-21 DIAGNOSIS — R76.8 HEPATITIS C ANTIBODY TEST POSITIVE: ICD-10-CM

## 2023-04-21 PROCEDURE — 36415 COLL VENOUS BLD VENIPUNCTURE: CPT

## 2023-04-21 PROCEDURE — 87522 HEPATITIS C REVRS TRNSCRPJ: CPT | Mod: 90

## 2023-04-21 PROCEDURE — 87902 NFCT AGT GNTYP ALYS HEP C: CPT | Mod: 90

## 2023-04-22 LAB — HCV RNA SERPL NAA+PROBE-ACNC: NORMAL IU/ML

## 2023-04-24 LAB — HCV GENTYP SERPL NAA+PROBE: NORMAL

## 2023-04-25 ENCOUNTER — OFFICE VISIT (OUTPATIENT)
Dept: FAMILY MEDICINE | Facility: CLINIC | Age: 43
End: 2023-04-25
Payer: MEDICAID

## 2023-04-25 VITALS
HEART RATE: 98 BPM | HEIGHT: 68 IN | BODY MASS INDEX: 26.13 KG/M2 | TEMPERATURE: 98 F | SYSTOLIC BLOOD PRESSURE: 100 MMHG | DIASTOLIC BLOOD PRESSURE: 70 MMHG | WEIGHT: 172.38 LBS

## 2023-04-25 DIAGNOSIS — G40.909 SEIZURE DISORDER: ICD-10-CM

## 2023-04-25 DIAGNOSIS — D67 HEMOPHILIA B: ICD-10-CM

## 2023-04-25 DIAGNOSIS — R74.8 ELEVATED LIVER ENZYMES: Primary | ICD-10-CM

## 2023-04-25 DIAGNOSIS — K21.9 GASTROESOPHAGEAL REFLUX DISEASE, UNSPECIFIED WHETHER ESOPHAGITIS PRESENT: ICD-10-CM

## 2023-04-25 DIAGNOSIS — F17.210 CIGARETTE SMOKER: ICD-10-CM

## 2023-04-25 DIAGNOSIS — I10 PRIMARY HYPERTENSION: ICD-10-CM

## 2023-04-25 DIAGNOSIS — Z78.9 DAILY CONSUMPTION OF ALCOHOL: ICD-10-CM

## 2023-04-25 DIAGNOSIS — G47.00 INSOMNIA, UNSPECIFIED TYPE: ICD-10-CM

## 2023-04-25 DIAGNOSIS — R10.9 ABDOMINAL PAIN, UNSPECIFIED ABDOMINAL LOCATION: ICD-10-CM

## 2023-04-25 PROBLEM — R46.89 NON-COMPLIANT BEHAVIOR: Status: ACTIVE | Noted: 2023-04-25

## 2023-04-25 LAB
ALBUMIN SERPL-MCNC: 4.3 G/DL (ref 3.4–5)
ALBUMIN/GLOB SERPL: 1.2 RATIO
ALP SERPL-CCNC: 107 UNIT/L (ref 50–144)
ALT SERPL-CCNC: 25 UNIT/L (ref 1–45)
ANION GAP SERPL CALC-SCNC: 9 MEQ/L (ref 2–13)
AST SERPL-CCNC: 65 UNIT/L (ref 17–59)
BASOPHILS # BLD AUTO: 0.07 X10(3)/MCL (ref 0.01–0.08)
BASOPHILS NFR BLD AUTO: 0.9 % (ref 0.1–1.2)
BILIRUBIN DIRECT+TOT PNL SERPL-MCNC: 0.6 MG/DL (ref 0–1)
BUN SERPL-MCNC: 4 MG/DL (ref 7–20)
CALCIUM SERPL-MCNC: 9.1 MG/DL (ref 8.4–10.2)
CHLORIDE SERPL-SCNC: 103 MMOL/L (ref 98–110)
CO2 SERPL-SCNC: 31 MMOL/L (ref 21–32)
CREAT SERPL-MCNC: 0.46 MG/DL (ref 0.66–1.25)
CREAT/UREA NIT SERPL: 9 (ref 12–20)
EOSINOPHIL # BLD AUTO: 0.08 X10(3)/MCL (ref 0.04–0.54)
EOSINOPHIL NFR BLD AUTO: 1.1 % (ref 0.7–7)
ERYTHROCYTE [DISTWIDTH] IN BLOOD BY AUTOMATED COUNT: 15.6 % (ref 11.6–14.4)
EST. AVERAGE GLUCOSE BLD GHB EST-MCNC: 93.9 MG/DL (ref 70–115)
GFR SERPLBLD CREATININE-BSD FMLA CKD-EPI: >90 MLS/MIN/1.73/M2
GLOBULIN SER-MCNC: 3.7 GM/DL (ref 2–3.9)
GLUCOSE SERPL-MCNC: 99 MG/DL (ref 70–115)
HBA1C MFR BLD: 4.9 % (ref 4–6)
HCT VFR BLD AUTO: 33.4 % (ref 36–52)
HGB BLD-MCNC: 11.7 G/DL (ref 13–18)
IMM GRANULOCYTES # BLD AUTO: 0.03 X10(3)/MCL (ref 0–0.03)
IMM GRANULOCYTES NFR BLD AUTO: 0.4 % (ref 0–0.5)
LYMPHOCYTES # BLD AUTO: 2.52 X10(3)/MCL (ref 1.32–3.57)
LYMPHOCYTES NFR BLD AUTO: 34.2 % (ref 20–55)
MCH RBC QN AUTO: 30.5 PG (ref 27–34)
MCV RBC AUTO: 87.2 FL (ref 79–99)
MEAN CELL HEMOGLOBIN CONCENTRATION (OHS) G/DL: 35 G/DL (ref 31–37)
MONOCYTES # BLD AUTO: 0.7 X10(3)/MCL (ref 0.3–0.82)
MONOCYTES NFR BLD AUTO: 9.5 % (ref 4.7–12.5)
NEUTROPHILS # BLD AUTO: 3.97 X10(3)/MCL (ref 1.78–5.38)
NEUTROPHILS NFR BLD AUTO: 53.9 % (ref 37–73)
NRBC BLD AUTO-RTO: 0 % (ref 0–1)
PLATELET # BLD AUTO: 273 X10(3)/MCL (ref 140–371)
PMV BLD AUTO: 11.1 FL (ref 9.4–12.4)
POTASSIUM SERPL-SCNC: 3 MMOL/L (ref 3.5–5.1)
PROT SERPL-MCNC: 8 GM/DL (ref 6.3–8.2)
RBC # BLD AUTO: 3.83 X10(6)/MCL (ref 4–6)
SODIUM SERPL-SCNC: 143 MMOL/L (ref 135–145)
TSH SERPL-ACNC: 1.34 UIU/ML (ref 0.36–3.74)
WBC # SPEC AUTO: 7.4 X10(3)/MCL (ref 4–11.5)

## 2023-04-25 PROCEDURE — 3078F PR MOST RECENT DIASTOLIC BLOOD PRESSURE < 80 MM HG: ICD-10-PCS | Mod: CPTII,,, | Performed by: NURSE PRACTITIONER

## 2023-04-25 PROCEDURE — 4010F ACE/ARB THERAPY RXD/TAKEN: CPT | Mod: CPTII,,, | Performed by: NURSE PRACTITIONER

## 2023-04-25 PROCEDURE — 3074F PR MOST RECENT SYSTOLIC BLOOD PRESSURE < 130 MM HG: ICD-10-PCS | Mod: CPTII,,, | Performed by: NURSE PRACTITIONER

## 2023-04-25 PROCEDURE — 84443 ASSAY THYROID STIM HORMONE: CPT | Performed by: NURSE PRACTITIONER

## 2023-04-25 PROCEDURE — 3008F BODY MASS INDEX DOCD: CPT | Mod: CPTII,,, | Performed by: NURSE PRACTITIONER

## 2023-04-25 PROCEDURE — 1159F PR MEDICATION LIST DOCUMENTED IN MEDICAL RECORD: ICD-10-PCS | Mod: CPTII,,, | Performed by: NURSE PRACTITIONER

## 2023-04-25 PROCEDURE — 3078F DIAST BP <80 MM HG: CPT | Mod: CPTII,,, | Performed by: NURSE PRACTITIONER

## 2023-04-25 PROCEDURE — 83036 HEMOGLOBIN GLYCOSYLATED A1C: CPT | Performed by: NURSE PRACTITIONER

## 2023-04-25 PROCEDURE — 3008F PR BODY MASS INDEX (BMI) DOCUMENTED: ICD-10-PCS | Mod: CPTII,,, | Performed by: NURSE PRACTITIONER

## 2023-04-25 PROCEDURE — 99214 OFFICE O/P EST MOD 30 MIN: CPT | Mod: ,,, | Performed by: NURSE PRACTITIONER

## 2023-04-25 PROCEDURE — 3074F SYST BP LT 130 MM HG: CPT | Mod: CPTII,,, | Performed by: NURSE PRACTITIONER

## 2023-04-25 PROCEDURE — 80053 COMPREHEN METABOLIC PANEL: CPT | Performed by: NURSE PRACTITIONER

## 2023-04-25 PROCEDURE — 1160F PR REVIEW ALL MEDS BY PRESCRIBER/CLIN PHARMACIST DOCUMENTED: ICD-10-PCS | Mod: CPTII,,, | Performed by: NURSE PRACTITIONER

## 2023-04-25 PROCEDURE — 1160F RVW MEDS BY RX/DR IN RCRD: CPT | Mod: CPTII,,, | Performed by: NURSE PRACTITIONER

## 2023-04-25 PROCEDURE — 4010F PR ACE/ARB THEARPY RXD/TAKEN: ICD-10-PCS | Mod: CPTII,,, | Performed by: NURSE PRACTITIONER

## 2023-04-25 PROCEDURE — 99214 PR OFFICE/OUTPT VISIT, EST, LEVL IV, 30-39 MIN: ICD-10-PCS | Mod: ,,, | Performed by: NURSE PRACTITIONER

## 2023-04-25 PROCEDURE — 85025 COMPLETE CBC W/AUTO DIFF WBC: CPT | Performed by: NURSE PRACTITIONER

## 2023-04-25 PROCEDURE — 1159F MED LIST DOCD IN RCRD: CPT | Mod: CPTII,,, | Performed by: NURSE PRACTITIONER

## 2023-04-25 RX ORDER — CETIRIZINE HYDROCHLORIDE 10 MG/1
10 TABLET ORAL NIGHTLY
COMMUNITY

## 2023-04-25 RX ORDER — COAGULATION FACTOR IX RECOMBINANT HUMAN 3500 (+/-)
8000 KIT INTRAVENOUS
COMMUNITY
Start: 2022-09-08

## 2023-04-25 RX ORDER — PANTOPRAZOLE SODIUM 40 MG/1
40 TABLET, DELAYED RELEASE ORAL DAILY
Qty: 60 TABLET | Refills: 1 | Status: SHIPPED | OUTPATIENT
Start: 2023-04-25

## 2023-04-25 RX ORDER — ACETAMINOPHEN 325 MG/1
2 TABLET ORAL 3 TIMES DAILY PRN
COMMUNITY
End: 2023-04-25

## 2023-04-25 RX ORDER — CHOLECALCIFEROL (VITAMIN D3) 25 MCG
6000 TABLET ORAL DAILY
COMMUNITY

## 2023-04-25 RX ORDER — IRBESARTAN 75 MG/1
75 TABLET ORAL DAILY
Qty: 90 TABLET | Refills: 3 | Status: SHIPPED | OUTPATIENT
Start: 2023-04-25 | End: 2023-06-28 | Stop reason: SDUPTHER

## 2023-04-25 NOTE — PROGRESS NOTES
Patient ID: [id number]     Chief Complaint: Anxiety and Hypertension (Follow up)      HPI:     Obinna Burton is a 42 y.o. male here today for a routine visit and abnormal lab results.  He reports having drank heavily the night before he did the lab work.  He states that he has tried to cut back since that time because he's been feeling tired.  Has been experiencing heartburn over the last several weeks.  He is also complaining of some abdominal pain which he states sometimes wraps around his left side to the back.  He reports compliance with his blood pressure medications and has been sleeping well with the use of mirtazapine at night.  He does report a history of a bleeding ulcer when he was a teenager. He is uncertain the last appointment he had with hematology but notes upcoming appointments in the near future. No seizures in 2+ years.     Past Medical History:  has a past medical history of Allergy, Anxiety, Daily consumption of alcohol, Elevated liver enzymes, Epilepsy, unspecified, not intractable, with status epilepticus, GERD (gastroesophageal reflux disease), H/O hemophilia B, Hepatitis C virus infection resolved after antiviral drug therapy, Hypertension, Insomnia, Non-compliant behavior, and Obesity, unspecified.    Surgical History:  has a past surgical history that includes Esophagogastroduodenoscopy (10/22/2021); Abdominal surgery (2008); and bilateral knee scope (1984).    Family History: family history includes Hypertension in his father and mother.    Social History:  reports that he has been smoking cigarettes. He has been smoking an average of 1 pack per day. He has been exposed to tobacco smoke. He has never used smokeless tobacco. He reports current alcohol use. He reports current drug use. Drug: Marijuana.    Current Outpatient Medications   Medication Instructions    amLODIPine (NORVASC) 5 MG tablet TAKE 1 TABLET BY MOUTH DAILY    cetirizine (ZYRTEC) 10 mg, Oral, Nightly    factor IX rec,  "albumin fusion (IDELVION) 3,500 (+/-) unit SolR 4,600 Units, Intravenous, Every 10 days    irbesartan (AVAPRO) 75 mg, Oral, Daily    levETIRAcetam (KEPPRA) 500 MG Tab TAKE 1 TABLET BY MOUTH TWO TIMES A DAY    mirtazapine (REMERON) 7.5 mg, Oral, Daily    pantoprazole (PROTONIX) 40 mg, Oral, Daily    vitamin D (VITAMIN D3) 6,000 Units, Oral, Daily       Patient is allergic to aspirin.     Patient Care Team:  AILEEN Caballero as PCP - General (Family Medicine)  Laura Rosales MD as Consulting Physician (Hematology and Oncology)       Subjective:     Review of Systems    See HPI     Objective:     Visit Vitals  /70 (BP Location: Right arm, Patient Position: Sitting)   Pulse 98   Temp 97.9 °F (36.6 °C) (Temporal)   Ht 5' 8.11" (1.73 m)   Wt 78.2 kg (172 lb 6.4 oz)   BMI 26.13 kg/m²       Physical Exam  HENT:      Nose: Nose normal.      Mouth/Throat:      Mouth: Mucous membranes are moist.   Cardiovascular:      Rate and Rhythm: Normal rate and regular rhythm.   Pulmonary:      Effort: Pulmonary effort is normal.      Breath sounds: Normal breath sounds.   Abdominal:      General: A surgical scar is present. Bowel sounds are decreased.      Palpations: Abdomen is soft.      Tenderness: There is abdominal tenderness in the epigastric area and left upper quadrant. There is no right CVA tenderness or left CVA tenderness. Negative signs include Davila's sign.   Musculoskeletal:         General: Deformity present.      Right lower leg: No edema.      Left lower leg: No edema.   Skin:     General: Skin is warm and dry.   Neurological:      Mental Status: He is alert.   Psychiatric:         Attention and Perception: Attention normal.         Mood and Affect: Mood normal.         Speech: Speech normal.         Behavior: Behavior normal. Behavior is cooperative.         Thought Content: Thought content normal.         Cognition and Memory: Cognition normal.         Judgment: Judgment normal.     Labs Reviewed: "     Chemistry:  Lab Results   Component Value Date     (L) 04/06/2023    K 3.4 (L) 04/06/2023    CHLORIDE 87 (L) 04/06/2023    BUN 15.0 04/06/2023    CREATININE 1.41 (H) 04/06/2023    EGFRNORACEVR 63 04/06/2023    GLUCOSE 130 (H) 04/06/2023    CALCIUM 8.6 04/06/2023    ALKPHOS 242 (H) 04/06/2023    LABPROT 9.3 (H) 04/06/2023    ALBUMIN 4.4 04/06/2023    BILIDIR 0.10 01/07/2020    IBILI 0.10 01/07/2020     (H) 04/06/2023     (H) 04/06/2023        Hematology:  Lab Results   Component Value Date    WBC 7.2 04/06/2023    RBC 4.61 04/06/2023    HGB 13.7 04/06/2023    HCT 36.6 04/06/2023    MCV 79.4 04/06/2023    MCH 29.7 04/06/2023    MCHC 37.4 (H) 04/06/2023    RDW 14.7 (H) 04/06/2023     (L) 04/06/2023    MPV 12.3 04/06/2023       Assessment:       ICD-10-CM ICD-9-CM   1. Elevated liver enzymes  R74.8 790.5   2. Abdominal pain, unspecified abdominal location  R10.9 789.00   3. Daily consumption of alcohol  Z78.9 V49.89   4. Gastroesophageal reflux disease, unspecified whether esophagitis present  K21.9 530.81   5. Hemophilia B  D67 286.1   6. Primary hypertension  I10 401.9   7. Cigarette smoker  F17.210 305.1   8. Seizure disorder  G40.909 345.90   9. Insomnia, unspecified type  G47.00 780.52        Plan:     He was advised today to stop drinking alcohol and stop taking Tylenol   Repeat labs   Ultrasound abdomen   Decreased irbesartan to 75 mg   Start Protonix daily    Follow up in about 2 weeks (around 5/9/2023) for Follow Up. In addition to their scheduled follow up, the patient has also been instructed to follow up on as needed basis.     AILEEN Perera

## 2023-05-18 ENCOUNTER — HOSPITAL ENCOUNTER (EMERGENCY)
Facility: HOSPITAL | Age: 43
Discharge: HOME | End: 2023-05-18
Attending: FAMILY MEDICINE
Payer: MEDICAID

## 2023-05-18 VITALS
RESPIRATION RATE: 20 BRPM | TEMPERATURE: 97 F | OXYGEN SATURATION: 98 % | HEIGHT: 68 IN | HEART RATE: 108 BPM | DIASTOLIC BLOOD PRESSURE: 86 MMHG | SYSTOLIC BLOOD PRESSURE: 125 MMHG | BODY MASS INDEX: 26.14 KG/M2 | WEIGHT: 172.5 LBS

## 2023-05-18 DIAGNOSIS — E87.6 HYPOKALEMIA: ICD-10-CM

## 2023-05-18 DIAGNOSIS — F10.929 ALCOHOLIC INTOXICATION WITH COMPLICATION: ICD-10-CM

## 2023-05-18 DIAGNOSIS — K92.2 GASTROINTESTINAL HEMORRHAGE, UNSPECIFIED GASTROINTESTINAL HEMORRHAGE TYPE: ICD-10-CM

## 2023-05-18 DIAGNOSIS — D67 HEMOPHILIA B: ICD-10-CM

## 2023-05-18 DIAGNOSIS — S09.90XA INJURY OF HEAD, INITIAL ENCOUNTER: Primary | ICD-10-CM

## 2023-05-18 LAB
ALBUMIN SERPL-MCNC: 3.9 G/DL (ref 3.4–5)
ALBUMIN/GLOB SERPL: 1.1 RATIO
ALP SERPL-CCNC: 96 UNIT/L (ref 50–144)
ALT SERPL-CCNC: 16 UNIT/L (ref 1–45)
ANION GAP SERPL CALC-SCNC: 8 MEQ/L (ref 2–13)
APTT PPP: 27.5 SECONDS (ref 23–29.4)
AST SERPL-CCNC: 56 UNIT/L (ref 17–59)
BASOPHILS # BLD AUTO: 0.03 X10(3)/MCL (ref 0.01–0.08)
BASOPHILS NFR BLD AUTO: 0.5 % (ref 0.1–1.2)
BILIRUBIN DIRECT+TOT PNL SERPL-MCNC: 0.6 MG/DL (ref 0–1)
BUN SERPL-MCNC: 4 MG/DL (ref 7–20)
CALCIUM SERPL-MCNC: 7.7 MG/DL (ref 8.4–10.2)
CHLORIDE SERPL-SCNC: 105 MMOL/L (ref 98–110)
CO2 SERPL-SCNC: 29 MMOL/L (ref 21–32)
COLOR STL: ABNORMAL
CONSISTENCY STL: ABNORMAL
CREAT SERPL-MCNC: 0.44 MG/DL (ref 0.66–1.25)
CREAT/UREA NIT SERPL: 9 (ref 12–20)
EOSINOPHIL # BLD AUTO: 0.04 X10(3)/MCL (ref 0.04–0.54)
EOSINOPHIL NFR BLD AUTO: 0.7 % (ref 0.7–7)
ERYTHROCYTE [DISTWIDTH] IN BLOOD BY AUTOMATED COUNT: 14.7 %
ETHANOL BLD-MCNC: 0.19 G/DL
ETHANOL SERPL-MCNC: 190 MG/DL
GFR SERPLBLD CREATININE-BSD FMLA CKD-EPI: >90 MLS/MIN/1.73/M2
GLOBULIN SER-MCNC: 3.4 GM/DL (ref 2–3.9)
GLUCOSE SERPL-MCNC: 131 MG/DL (ref 70–115)
HCT VFR BLD AUTO: 29.5 % (ref 36–52)
HCT VFR BLD AUTO: 30.9 % (ref 36–52)
HEMOCCULT SP1 STL QL: POSITIVE
HGB BLD-MCNC: 10.5 G/DL (ref 13–18)
HGB BLD-MCNC: 11.3 G/DL (ref 13–18)
IMM GRANULOCYTES # BLD AUTO: 0.03 X10(3)/MCL (ref 0–0.03)
IMM GRANULOCYTES NFR BLD AUTO: 0.5 % (ref 0–0.5)
INR BLD: 1.2
LYMPHOCYTES # BLD AUTO: 2.61 X10(3)/MCL (ref 1.32–3.57)
LYMPHOCYTES NFR BLD AUTO: 44.8 % (ref 20–55)
MCH RBC QN AUTO: 32.5 PG (ref 27–34)
MCHC RBC AUTO-ENTMCNC: 35.6 G/DL (ref 31–37)
MCV RBC AUTO: 91.3 FL (ref 79–99)
MONOCYTES # BLD AUTO: 0.46 X10(3)/MCL (ref 0.3–0.82)
MONOCYTES NFR BLD AUTO: 7.9 % (ref 4.7–12.5)
NEUTROPHILS # BLD AUTO: 2.65 X10(3)/MCL (ref 1.78–5.38)
NEUTROPHILS NFR BLD AUTO: 45.6 % (ref 37–73)
NRBC BLD AUTO-RTO: 0 %
PLATELET # BLD AUTO: 233 X10(3)/MCL (ref 140–371)
PMV BLD AUTO: 9.6 FL (ref 9.4–12.4)
POTASSIUM SERPL-SCNC: 2.7 MMOL/L (ref 3.5–5.1)
PROT SERPL-MCNC: 7.3 GM/DL (ref 6.3–8.2)
PROTHROMBIN TIME: 12.1 SECONDS (ref 9.3–11.9)
RBC # BLD AUTO: 3.23 X10(6)/MCL (ref 4–6)
SODIUM SERPL-SCNC: 142 MMOL/L (ref 135–145)
WBC # SPEC AUTO: 5.82 X10(3)/MCL (ref 4–11.5)

## 2023-05-18 PROCEDURE — 63600175 PHARM REV CODE 636 W HCPCS: Performed by: FAMILY MEDICINE

## 2023-05-18 PROCEDURE — 25000003 PHARM REV CODE 250: Performed by: FAMILY MEDICINE

## 2023-05-18 PROCEDURE — 85014 HEMATOCRIT: CPT | Performed by: FAMILY MEDICINE

## 2023-05-18 PROCEDURE — 96365 THER/PROPH/DIAG IV INF INIT: CPT

## 2023-05-18 PROCEDURE — 96375 TX/PRO/DX INJ NEW DRUG ADDON: CPT

## 2023-05-18 PROCEDURE — 96361 HYDRATE IV INFUSION ADD-ON: CPT

## 2023-05-18 PROCEDURE — 96360 HYDRATION IV INFUSION INIT: CPT | Mod: 59

## 2023-05-18 PROCEDURE — 36415 COLL VENOUS BLD VENIPUNCTURE: CPT | Performed by: FAMILY MEDICINE

## 2023-05-18 PROCEDURE — 99285 EMERGENCY DEPT VISIT HI MDM: CPT | Mod: 25

## 2023-05-18 PROCEDURE — 85610 PROTHROMBIN TIME: CPT | Performed by: FAMILY MEDICINE

## 2023-05-18 PROCEDURE — 80053 COMPREHEN METABOLIC PANEL: CPT | Performed by: FAMILY MEDICINE

## 2023-05-18 PROCEDURE — C9113 INJ PANTOPRAZOLE SODIUM, VIA: HCPCS | Performed by: FAMILY MEDICINE

## 2023-05-18 PROCEDURE — 82272 OCCULT BLD FECES 1-3 TESTS: CPT | Performed by: FAMILY MEDICINE

## 2023-05-18 PROCEDURE — 85025 COMPLETE CBC W/AUTO DIFF WBC: CPT | Performed by: FAMILY MEDICINE

## 2023-05-18 PROCEDURE — 82077 ASSAY SPEC XCP UR&BREATH IA: CPT | Performed by: FAMILY MEDICINE

## 2023-05-18 PROCEDURE — 85730 THROMBOPLASTIN TIME PARTIAL: CPT | Performed by: FAMILY MEDICINE

## 2023-05-18 RX ORDER — SODIUM CHLORIDE 9 MG/ML
1000 INJECTION, SOLUTION INTRAVENOUS
Status: COMPLETED | OUTPATIENT
Start: 2023-05-18 | End: 2023-05-18

## 2023-05-18 RX ORDER — POTASSIUM CHLORIDE 7.45 MG/ML
10 INJECTION INTRAVENOUS
Status: COMPLETED | OUTPATIENT
Start: 2023-05-18 | End: 2023-05-18

## 2023-05-18 RX ORDER — PANTOPRAZOLE SODIUM 40 MG/10ML
40 INJECTION, POWDER, LYOPHILIZED, FOR SOLUTION INTRAVENOUS
Status: COMPLETED | OUTPATIENT
Start: 2023-05-18 | End: 2023-05-18

## 2023-05-18 RX ORDER — POTASSIUM CHLORIDE 20 MEQ/1
40 TABLET, EXTENDED RELEASE ORAL DAILY
Status: DISCONTINUED | OUTPATIENT
Start: 2023-05-18 | End: 2023-05-18 | Stop reason: HOSPADM

## 2023-05-18 RX ORDER — POTASSIUM CHLORIDE 20 MEQ/1
20 TABLET, EXTENDED RELEASE ORAL DAILY
Qty: 7 TABLET | Refills: 0 | Status: SHIPPED | OUTPATIENT
Start: 2023-05-18 | End: 2023-05-25

## 2023-05-18 RX ADMIN — POTASSIUM CHLORIDE 40 MEQ: 20 TABLET, EXTENDED RELEASE ORAL at 10:05

## 2023-05-18 RX ADMIN — POTASSIUM CHLORIDE 10 MEQ: 7.46 INJECTION, SOLUTION INTRAVENOUS at 10:05

## 2023-05-18 RX ADMIN — SODIUM CHLORIDE 1000 ML: 9 INJECTION, SOLUTION INTRAVENOUS at 10:05

## 2023-05-18 RX ADMIN — PANTOPRAZOLE SODIUM 40 MG: 40 INJECTION, POWDER, FOR SOLUTION INTRAVENOUS at 10:05

## 2023-05-18 NOTE — ED PROVIDER NOTES
Encounter Date: 5/18/2023       History     Chief Complaint   Patient presents with    Fall     Pt brought In by [facility] via stretcher with c/o fall, said he hit his head on concrete to lt occiput.  Hx of seizures.  Has 18 g to lt ac with ns infusing and cbg was 175.     Patient presented to the emergency room via EMS transport.  Patient drank alcohol throughout the night last night, and hit his head after falling.  EMS transported patient to the emergency room found his blood pressure to be low, gave a fluid bolus of 500 cc normal saline, and pressure is normal on arrival here.  He has a history of hemophilia B, and as patient arrived to the emergency room, the [place] [facility] contacted me in the emergency room.  Was able to discuss with Dr. Marie, the pt's hematologist.  Advised to immediately give the patient his BeneFix, which the mother is carrying.  The patient gives himself a vial every 10 days and with any kind of significant trauma.    The patient states he feels tired at the moment otherwise only has mild headache.    The history is provided by the patient and the EMS personnel (SAM Sahu and Dr. Marie).   Review of patient's allergies indicates:   Allergen Reactions    Aspirin Other (See Comments)     Thins blood  Other reaction(s): exacerbates bleeding  ASA may exacerbate bleeding       Past Medical History:   Diagnosis Date    Allergy     seasonal    Anxiety     Daily consumption of alcohol     Elevated liver enzymes     Epilepsy, unspecified, not intractable, with status epilepticus     GERD (gastroesophageal reflux disease)     H/O hemophilia B     Hepatitis C virus infection resolved after antiviral drug therapy     Hypertension     Insomnia     Non-compliant behavior     Obesity, unspecified      Past Surgical History:   Procedure Laterality Date    ABDOMINAL SURGERY  2008    bilateral knee scope  1984    ESOPHAGOGASTRODUODENOSCOPY  10/22/2021      Family History   Problem Relation Age of Onset    Hypertension Mother     Hypertension Father      Social History     Tobacco Use    Smoking status: Every Day     Packs/day: 1.00     Types: Cigarettes     Passive exposure: Current    Smokeless tobacco: Never   Substance Use Topics    Alcohol use: Yes     Comment: beer daily    Drug use: Yes     Types: Marijuana     Comment: daily     Review of Systems   Constitutional:  Negative for fever.   HENT:  Negative for sore throat.    Respiratory:  Negative for shortness of breath.    Cardiovascular:  Negative for chest pain.   Gastrointestinal:  Negative for nausea.   Genitourinary:  Negative for dysuria.   Musculoskeletal:  Negative for back pain.   Skin:  Negative for rash.   Neurological:  Positive for headaches. Negative for weakness.   Hematological:  Does not bruise/bleed easily.   All other systems reviewed and are negative.    Physical Exam     Initial Vitals [05/18/23 0920]   BP Pulse Resp Temp SpO2   120/82 85 18 97.2 °F (36.2 °C) 99 %      MAP       --         Physical Exam    Nursing note and vitals reviewed.  Constitutional: Vital signs are normal. He appears well-developed and well-nourished. He is cooperative.  Non-toxic appearance. He does not appear ill.   Strong ETOH odor   HENT:   Left posterior scalp with small hematoma   Eyes: Conjunctivae and lids are normal.   Neck: Trachea normal. Neck supple.   Cardiovascular:  Normal rate and regular rhythm.  No extrasystoles are present.          Pulmonary/Chest: Breath sounds normal.   Abdominal: Abdomen is soft. There is no abdominal tenderness.   Musculoskeletal:         General: Normal range of motion.      Cervical back: Neck supple.     Neurological: He is alert and oriented to person, place, and time. He has normal strength. No cranial nerve deficit or sensory deficit. He displays a negative Romberg sign.   Skin: Skin is warm, dry and intact. Capillary refill takes less than 2 seconds.   Psychiatric:  He has a normal mood and affect. His speech is normal and behavior is normal. He is not actively hallucinating. He is attentive.       ED Course   Critical Care    Date/Time: 5/18/2023 9:10 AM  Performed by: Dion Lopez MD  Authorized by: Dion Lopez MD   Direct patient critical care time: 25 minutes  Additional history critical care time: 15 minutes  Ordering / reviewing critical care time: 10 minutes  Documentation critical care time: 15 minutes  Consulting other physicians critical care time: 20 minutes  Total critical care time (exclusive of procedural time) : 85 minutes      Labs Reviewed   COMPREHENSIVE METABOLIC PANEL - Abnormal; Notable for the following components:       Result Value    Potassium Level 2.7 (*)     Glucose Level 131 (*)     Blood Urea Nitrogen 4.0 (*)     Creatinine 0.44 (*)     Calcium Level Total 7.7 (*)     BUN/Creatinine Ratio 9 (*)     All other components within normal limits   PROTIME-INR - Abnormal; Notable for the following components:    PT 12.1 (*)     All other components within normal limits    Narrative:     Protimes are used to monitor anticoagulant agents such as warfarin. PT INR values are based on the current patient normal mean and the ARAVIND value for the specific instrument reagent used.  **Routine theraputic target values for the INR are 2.0-3.0**   ALCOHOL,MEDICAL (ETHANOL) - Abnormal; Notable for the following components:    Ethanol Level 190.0 (*)     Alcohol, Legal Level 0.190 (*)     All other components within normal limits   CBC WITH DIFFERENTIAL - Abnormal; Notable for the following components:    RBC 3.23 (*)     Hgb 10.5 (*)     Hct 29.5 (*)     All other components within normal limits   HEMOGLOBIN AND HEMATOCRIT, BLOOD - Abnormal; Notable for the following components:    Hgb 11.3 (*)     Hct 30.9 (*)     All other components within normal limits   OCCULT BLOOD X 3, STOOL - Abnormal; Notable for the following components:    Occult Blood Stool 1 Positive  (*)     All other components within normal limits   APTT - Normal   CBC W/ AUTO DIFFERENTIAL    Narrative:     The following orders were created for panel order CBC Auto Differential.  Procedure                               Abnormality         Status                     ---------                               -----------         ------                     CBC with Differential[[id number]]        Abnormal            Final result                 Please view results for these tests on the individual orders.   OCCULT BLOOD,STOOL,DIAGNOSTIC (1-3)    Narrative:     The following orders were created for panel order Occult Blood, Stool, Diagnostic (1-3).  Procedure                               Abnormality         Status                     ---------                               -----------         ------                     Occult blood x 3, stool[[id number]]      Abnormal            Final result               Occult Blood, Stool 2nd ...[[id number]]                                                   Please view results for these tests on the individual orders.   OCCULT BLOOD, STOOL 2ND SPECIMEN          Imaging Results              CT Head Without Contrast (Final result)  Result time 05/18/23 10:01:14      Final result by Carlyle Olmstead III, MD (05/18/23 10:01:14)                   Impression:      1. Chronic changes are present including minimal cerebral atrophy and an old, nonhemorrhagic, lacunar infarct within the deep white matter of the posterior parietal lobe (unchanged when compared to the previous exam).      Electronically signed by: Carlyle Olmstead  Date:    05/18/2023  Time:    10:01               Narrative:    EXAMINATION:  STUDY: CT HEAD WITHOUT CONTRAST    CLINICAL HISTORY AND TECHNIQUE:  RT Aftab on 5/18/2023  9:56 AM    PATIENT STATUS : ER    PROCEDURE: CT BRAIN W/O    CLINICAL HX: FALL, HIT HEAD ON CONCRETE,LEFT OCCIPUT,  HX OF SEIZURES    PMH:  ANXIETY, SEIZURES, HTN , EPILEPSY, HEMOPHILIA B,    IV  CONTRAST: NONE    ORAL CONTRAST: NONE    RECTAL CONTRAST:NONE    AXIAL IMAGES @ 5MM INTERVALS WITH MULTI PLANAR RECONSTRUCTION OF IMAGES    TOTAL IMAGE NUMBER : 34    NUMBER OF CT SCANS LAST 12 MONTHS : 2    CTDIvol(mGy) : HEAD: 52.30      BODY:    DLP (mGycm) : HEAD : 1070.50     BODY:    TECH : DB    PT TRANSPORTED W/O INCIDENT    This patient has had 2 CT and nuclear medicine scans performed within the last 12 months.    The following DOSE REDUCTION TECHNIQUES are used for all CT scans at [facility]:    1. Automated exposure control.  2. Adjustment of the mA and/or kv according to patient size.  3. Use of iterative reconstruction technique.    COMPARISON:  10/26/2022    FINDINGS:  Axial imaging through the head/brain was performed. Minimal cerebral atrophy accentuates the ventricles and sulci.  An old, nonhemorrhagic, lacunar infarct is noted within the deep white matter of the right posterior parietal lobe as seen previously.  I see no intraparenchymal masses, hemorrhagic lesions, or dominant wedge-shaped infarcts. I see no extra-axial masses or abnormal fluid collections.                                    X-Rays:   Independently Interpreted Readings:   Head CT: No acute   Medications   potassium chloride SA CR tablet 40 mEq (40 mEq Oral Given 5/18/23 1032)   potassium chloride 10 mEq in 100 mL IVPB (0 mEq Intravenous Stopped 5/18/23 1144)   0.9%  NaCl infusion (0 mLs Intravenous Stopped 5/18/23 1225)   pantoprazole injection 40 mg (40 mg Intravenous Given 5/18/23 1050)     Medical Decision Making:   Initial Assessment:   Head injury, alcohol intoxication, hemophilia B  Differential Diagnosis:   Cranial hemorrhage, syncope, severe anemia, dehydration  Clinical Tests:   Lab Tests: Ordered and Reviewed  The following lab test(s) were unremarkable: CBC, CMP, PT and PTT       <> Summary of Lab: H&H is low, 10 / 29.  Radiological Study: Ordered and Reviewed  ED Management:  BeneFix given to  patient.    Discuss results of treatment with Dr. Rosales, his hematologist.  She stated that last week his hemoglobin was 12.  She recommended occult blood and would prefer an outpatient GI referral that she will handle.    Discussed with patient, who says it every once in awhile after drinking ill have a little blood in his vomitus.  He denies any melena or stomach upset though.  Vital signs are good right now the blood pressure 137/90.  We will recheck an H&H in an hour and have given Protonix IV.  We will try to obtain a stool sample before the patient leaves.  If blood count drops though, or the patient becomes hypotensive, we will have to pursue a more urgent GI referral in transfer from the ER.  We will monitor for now.  Discuss results and treatment plan with the patient and his mother, both agree, and questions were answered.    Hemoglobin now 11.3 on repeat.  Vital signs are stable 137/96 blood pressure and he is feeling okay.  Discussed with  again, she will manage outpatient H&Hs, and arrange for a GI consult hopefully within the [place] area, and she will keep in touch with him.  We will discharge the patient today with follow-up hopefully in a GI clinic soon, and return precautions given.  Asking him to start on Pepcid AC twice a day and continue on his omeprazole.  Discussed thoroughly with the patient, he understands.                        Clinical Impression:   Final diagnoses:  [S09.90XA] Injury of head, initial encounter (Primary)  [F10.929] Alcoholic intoxication with complication  [E87.6] Hypokalemia  [D67] Hemophilia B  [K92.2] Gastrointestinal hemorrhage, unspecified gastrointestinal hemorrhage type        ED Disposition Condition    Discharge Stable          ED Prescriptions       Medication Sig Dispense Start Date End Date Auth. Provider    potassium chloride SA (K-DUR,KLOR-CON) 20 MEQ tablet Take 1 tablet (20 mEq total) by mouth once daily. for 7 days 7 tablet 5/18/2023  5/25/2023 Dion Lopez MD          Follow-up Information       Follow up With Specialties Details Why Contact Info    Dr. Aneudy Lopez MD  05/18/23 [id number]

## 2023-05-18 NOTE — DISCHARGE INSTRUCTIONS
Make sure you take your daily stomach acid medication.  Also start on Pepcid which is available over-the-counter, take that pill twice a day. Dr. Rosales is going to be monitoring your blood counts over the next week or so, and is arranging for a GI specialist to consult.      Return to the closest ER with brisk bleeding, weakness, etc.

## 2023-05-22 ENCOUNTER — TELEPHONE (OUTPATIENT)
Dept: FAMILY MEDICINE | Facility: CLINIC | Age: 43
End: 2023-05-22
Payer: MEDICAID

## 2023-05-22 DIAGNOSIS — K92.2 GASTROINTESTINAL HEMORRHAGE, UNSPECIFIED GASTROINTESTINAL HEMORRHAGE TYPE: ICD-10-CM

## 2023-05-22 DIAGNOSIS — K21.9 GASTROESOPHAGEAL REFLUX DISEASE, UNSPECIFIED WHETHER ESOPHAGITIS PRESENT: ICD-10-CM

## 2023-05-22 DIAGNOSIS — Z78.9 DAILY CONSUMPTION OF ALCOHOL: Primary | ICD-10-CM

## 2023-05-22 DIAGNOSIS — G40.909 SEIZURE DISORDER: ICD-10-CM

## 2023-05-22 NOTE — TELEPHONE ENCOUNTER
Adelina with the [facility] at [facility] called to let you know that he was brought to the ER on 5/18 for a fall causing an injury to his head. Final dx was alcoholic intoxication with complication but he told her that he had a seizure. She said that he is on Keppra so he may need to see a neurologist. She also feels as if he needs a follow up with you.

## 2023-05-23 NOTE — TELEPHONE ENCOUNTER
Adelina with Dr Rosales 's office called and asked that the referrals for GI and Neurology be sent by you since they are in [facility] and do not know any Dr's in this area. Please advise.

## 2023-05-25 ENCOUNTER — TELEPHONE (OUTPATIENT)
Dept: FAMILY MEDICINE | Facility: CLINIC | Age: 43
End: 2023-05-25
Payer: MEDICAID

## 2023-05-25 ENCOUNTER — HOSPITAL ENCOUNTER (OUTPATIENT)
Dept: RADIOLOGY | Facility: HOSPITAL | Age: 43
Discharge: HOME | End: 2023-05-25
Attending: NURSE PRACTITIONER
Payer: MEDICAID

## 2023-05-25 DIAGNOSIS — R74.8 ELEVATED LIVER ENZYMES: ICD-10-CM

## 2023-05-25 DIAGNOSIS — R10.9 ABDOMINAL PAIN, UNSPECIFIED ABDOMINAL LOCATION: ICD-10-CM

## 2023-05-25 DIAGNOSIS — Z78.9 DAILY CONSUMPTION OF ALCOHOL: ICD-10-CM

## 2023-05-25 DIAGNOSIS — D67 HEMOPHILIA B: ICD-10-CM

## 2023-05-25 PROCEDURE — 76700 US EXAM ABDOM COMPLETE: CPT | Mod: TC

## 2023-05-25 NOTE — TELEPHONE ENCOUNTER
----- Message from JERSEY Caballero-AMARJIT sent at 5/25/2023  4:27 PM CDT -----  Please inform patient of results.    1. Fatty liver disease.  Needs to see Gastroenterology and I believe has already been referred for GI bleed. If this is not correct, please order referral.  Also, please advise that he needs to stop drinking alcohol of any kind and avoid tylenol.

## 2023-05-26 ENCOUNTER — TELEPHONE (OUTPATIENT)
Dept: FAMILY MEDICINE | Facility: CLINIC | Age: 43
End: 2023-05-26
Payer: MEDICAID

## 2023-05-26 DIAGNOSIS — I10 ESSENTIAL (PRIMARY) HYPERTENSION: ICD-10-CM

## 2023-05-26 DIAGNOSIS — R56.9 SEIZURE: ICD-10-CM

## 2023-05-26 RX ORDER — AMLODIPINE BESYLATE 5 MG/1
5 TABLET ORAL DAILY
Qty: 30 TABLET | Refills: 2 | Status: SHIPPED | OUTPATIENT
Start: 2023-05-26 | End: 2024-03-20 | Stop reason: SDUPTHER

## 2023-05-26 RX ORDER — LEVETIRACETAM 500 MG/1
500 TABLET ORAL 2 TIMES DAILY
Qty: 60 TABLET | Refills: 2 | Status: SHIPPED | OUTPATIENT
Start: 2023-05-26 | End: 2024-01-16 | Stop reason: SDUPTHER

## 2023-05-26 NOTE — TELEPHONE ENCOUNTER
Called and spoke with mom and she stated that she has been talking to him until she is blue in the face to quit drinking and he wont so she asked me to call him and I told her I would.     Called patient and told him that he needed to quit drinking and he stated that he knows that and I also told him that he needed to not take any tylenol and he stated that he understood. Also made him an appointment since he is late for visit.

## 2023-06-28 ENCOUNTER — OFFICE VISIT (OUTPATIENT)
Dept: FAMILY MEDICINE | Facility: CLINIC | Age: 43
End: 2023-06-28
Payer: MEDICAID

## 2023-06-28 VITALS
HEART RATE: 82 BPM | HEIGHT: 68 IN | BODY MASS INDEX: 26.16 KG/M2 | TEMPERATURE: 98 F | DIASTOLIC BLOOD PRESSURE: 82 MMHG | SYSTOLIC BLOOD PRESSURE: 130 MMHG | OXYGEN SATURATION: 97 % | WEIGHT: 172.63 LBS

## 2023-06-28 DIAGNOSIS — M36.2 HEMOPHILIC ARTHROPATHY: ICD-10-CM

## 2023-06-28 DIAGNOSIS — R74.8 ELEVATED LIVER ENZYMES: ICD-10-CM

## 2023-06-28 DIAGNOSIS — K92.1 BLOOD IN STOOL: ICD-10-CM

## 2023-06-28 DIAGNOSIS — I10 PRIMARY HYPERTENSION: Primary | ICD-10-CM

## 2023-06-28 DIAGNOSIS — G47.00 INSOMNIA, UNSPECIFIED TYPE: ICD-10-CM

## 2023-06-28 DIAGNOSIS — G40.909 SEIZURE DISORDER: ICD-10-CM

## 2023-06-28 DIAGNOSIS — D66 HEMOPHILIC ARTHROPATHY: ICD-10-CM

## 2023-06-28 PROCEDURE — 99213 OFFICE O/P EST LOW 20 MIN: CPT | Mod: ,,, | Performed by: NURSE PRACTITIONER

## 2023-06-28 PROCEDURE — 1159F MED LIST DOCD IN RCRD: CPT | Mod: CPTII,,, | Performed by: NURSE PRACTITIONER

## 2023-06-28 PROCEDURE — 1160F PR REVIEW ALL MEDS BY PRESCRIBER/CLIN PHARMACIST DOCUMENTED: ICD-10-PCS | Mod: CPTII,,, | Performed by: NURSE PRACTITIONER

## 2023-06-28 PROCEDURE — 4010F PR ACE/ARB THEARPY RXD/TAKEN: ICD-10-PCS | Mod: CPTII,,, | Performed by: NURSE PRACTITIONER

## 2023-06-28 PROCEDURE — 3079F PR MOST RECENT DIASTOLIC BLOOD PRESSURE 80-89 MM HG: ICD-10-PCS | Mod: CPTII,,, | Performed by: NURSE PRACTITIONER

## 2023-06-28 PROCEDURE — 3008F PR BODY MASS INDEX (BMI) DOCUMENTED: ICD-10-PCS | Mod: CPTII,,, | Performed by: NURSE PRACTITIONER

## 2023-06-28 PROCEDURE — 4010F ACE/ARB THERAPY RXD/TAKEN: CPT | Mod: CPTII,,, | Performed by: NURSE PRACTITIONER

## 2023-06-28 PROCEDURE — 3008F BODY MASS INDEX DOCD: CPT | Mod: CPTII,,, | Performed by: NURSE PRACTITIONER

## 2023-06-28 PROCEDURE — 3079F DIAST BP 80-89 MM HG: CPT | Mod: CPTII,,, | Performed by: NURSE PRACTITIONER

## 2023-06-28 PROCEDURE — 1159F PR MEDICATION LIST DOCUMENTED IN MEDICAL RECORD: ICD-10-PCS | Mod: CPTII,,, | Performed by: NURSE PRACTITIONER

## 2023-06-28 PROCEDURE — 3075F SYST BP GE 130 - 139MM HG: CPT | Mod: CPTII,,, | Performed by: NURSE PRACTITIONER

## 2023-06-28 PROCEDURE — 1160F RVW MEDS BY RX/DR IN RCRD: CPT | Mod: CPTII,,, | Performed by: NURSE PRACTITIONER

## 2023-06-28 PROCEDURE — 99213 PR OFFICE/OUTPT VISIT, EST, LEVL III, 20-29 MIN: ICD-10-PCS | Mod: ,,, | Performed by: NURSE PRACTITIONER

## 2023-06-28 PROCEDURE — 3075F PR MOST RECENT SYSTOLIC BLOOD PRESS GE 130-139MM HG: ICD-10-PCS | Mod: CPTII,,, | Performed by: NURSE PRACTITIONER

## 2023-06-28 RX ORDER — COAGULATION FACTOR IX (RECOMBINANT) 3000 UNIT
3000 KIT INTRAVENOUS
COMMUNITY
Start: 2023-05-25 | End: 2023-06-28 | Stop reason: DRUGHIGH

## 2023-06-28 RX ORDER — FACTOR IX HUMAN RECOMBINANT 2000 UNIT
2000 VIAL (EA) INTRAVENOUS
COMMUNITY
Start: 2023-05-25 | End: 2023-06-28 | Stop reason: DRUGHIGH

## 2023-06-28 RX ORDER — IRBESARTAN 75 MG/1
75 TABLET ORAL DAILY
Qty: 90 TABLET | Refills: 3 | Status: SHIPPED | OUTPATIENT
Start: 2023-06-28

## 2023-06-28 NOTE — PROGRESS NOTES
Patient ID: [id number]     Chief Complaint: Hypertension (Follow up)      HPI:     Obinna Burton is a 42 y.o. male here today for a follow up on blood pressure.  Not taking irbseartan for unknown amount of time.  Has been trying to stop drinking since he was told about his fatty liver.  Saturday was the last time he drank.  This was about 4 days ago and he states he only had 1.5 beers.  He has not been taking any tylenol.  He was referred to GI last month (blood in stool, liver disease), has not yet received an appointment.  No vomiting blood since he quit drinking.  He's seen ortho since his last appointment here and states he can't have any knee replacements until he sees a dentist.  He can't get an appointment until December. He still has not seen neurology for seizure disorder.        Past Medical History:  has a past medical history of Allergy, Anxiety, Daily consumption of alcohol, Elevated liver enzymes, Epilepsy, unspecified, not intractable, with status epilepticus, GERD (gastroesophageal reflux disease), H/O hemophilia B, Hepatitis C virus infection resolved after antiviral drug therapy, Hypertension, Insomnia, Non-compliant behavior, and Obesity, unspecified.    Social History:  reports that he has been smoking cigarettes. He has been smoking an average of 1 pack per day. He has been exposed to tobacco smoke. He has never used smokeless tobacco. He reports current alcohol use. He reports current drug use. Drug: Marijuana.    Current Outpatient Medications   Medication Instructions    amLODIPine (NORVASC) 5 mg, Oral, Daily    cetirizine (ZYRTEC) 10 mg, Oral, Nightly    factor IX rec, albumin fusion (IDELVION) 3,500 (+/-) unit SolR 8,000 Units, Intravenous, Every 10 days    irbesartan (AVAPRO) 75 mg, Oral, Daily    levETIRAcetam (KEPPRA) 500 mg, Oral, 2 times daily    mirtazapine (REMERON) 7.5 mg, Oral, Daily    pantoprazole (PROTONIX) 40 mg, Oral, Daily    vitamin D (VITAMIN D3) 6,000 Units, Oral, Daily  "      Patient is allergic to aspirin.     Patient Care Team:  AILEEN Caballero as PCP - General (Family Medicine)  Laura Rosales MD as Consulting Physician (Hematology and Oncology)       Subjective       Review of Systems    See HPI     Objective       Visit Vitals  /82 (BP Location: Right arm, Patient Position: Sitting)   Pulse 82   Temp 97.9 °F (36.6 °C) (Temporal)   Ht 5' 8" (1.727 m)   Wt 78.3 kg (172 lb 9.9 oz)   SpO2 97%   BMI 26.25 kg/m²       Physical Exam  Vitals reviewed.   Constitutional:       Appearance: Normal appearance.   Cardiovascular:      Rate and Rhythm: Normal rate and regular rhythm.   Pulmonary:      Effort: Pulmonary effort is normal.      Breath sounds: Normal breath sounds.   Musculoskeletal:      Right knee: Deformity present.      Left knee: Deformity present.   Skin:     General: Skin is warm and dry.   Neurological:      Mental Status: He is alert and oriented to person, place, and time.   Psychiatric:         Attention and Perception: Attention normal.         Mood and Affect: Mood normal.         Speech: Speech normal.         Behavior: Behavior normal. Behavior is cooperative.         Thought Content: Thought content normal.         Cognition and Memory: Cognition normal.     Assessment:       ICD-10-CM ICD-9-CM   1. Primary hypertension  I10 401.9   2. Insomnia, unspecified type  G47.00 780.52   3. Elevated liver enzymes  R74.8 790.5   4. 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 Seizure disorder  G40.909 345.90        Plan:     Follow up on GI and neurology referrals  Stressed importance of staying sober    Follow up in about 6 weeks (around 8/9/2023) for Follow Up. In addition to their scheduled follow up, the patient has also been instructed to follow up on as needed basis.     "

## 2024-01-08 ENCOUNTER — HOSPITAL ENCOUNTER (EMERGENCY)
Facility: HOSPITAL | Age: 44
Discharge: HOME | End: 2024-01-08
Attending: FAMILY MEDICINE
Payer: MEDICAID

## 2024-01-08 ENCOUNTER — TELEPHONE (OUTPATIENT)
Dept: FAMILY MEDICINE | Facility: CLINIC | Age: 44
End: 2024-01-08
Payer: MEDICAID

## 2024-01-08 VITALS
HEART RATE: 98 BPM | HEIGHT: 68 IN | OXYGEN SATURATION: 98 % | TEMPERATURE: 98 F | WEIGHT: 175 LBS | BODY MASS INDEX: 26.52 KG/M2 | RESPIRATION RATE: 18 BRPM | DIASTOLIC BLOOD PRESSURE: 91 MMHG | SYSTOLIC BLOOD PRESSURE: 141 MMHG

## 2024-01-08 DIAGNOSIS — D67 HEMOPHILIA B: Primary | ICD-10-CM

## 2024-01-08 DIAGNOSIS — F10.920 ALCOHOLIC INTOXICATION WITHOUT COMPLICATION: ICD-10-CM

## 2024-01-08 LAB
ALBUMIN SERPL-MCNC: 5 G/DL (ref 3.4–5)
ALBUMIN SERPL-MCNC: 5.3 G/DL (ref 3.4–5)
ALBUMIN/GLOB SERPL: 1.1 RATIO
ALBUMIN/GLOB SERPL: 1.2 RATIO
ALP SERPL-CCNC: 53 UNIT/L (ref 50–144)
ALP SERPL-CCNC: 74 UNIT/L (ref 50–144)
ALT SERPL-CCNC: 67 UNIT/L (ref 1–45)
ALT SERPL-CCNC: 68 UNIT/L (ref 1–45)
ANION GAP SERPL CALC-SCNC: 14 MEQ/L (ref 2–13)
ANION GAP SERPL CALC-SCNC: 18 MEQ/L (ref 2–13)
APTT PPP: 35.1 SECONDS (ref 23–29.4)
AST SERPL-CCNC: 109 UNIT/L (ref 17–59)
AST SERPL-CCNC: 130 UNIT/L (ref 17–59)
BASOPHILS # BLD AUTO: 0.06 X10(3)/MCL (ref 0.01–0.08)
BASOPHILS NFR BLD AUTO: 1.2 % (ref 0.1–1.2)
BILIRUB SERPL-MCNC: 0.6 MG/DL (ref 0–1)
BILIRUB SERPL-MCNC: 1.6 MG/DL (ref 0–1)
BUN SERPL-MCNC: 8 MG/DL (ref 7–20)
BUN SERPL-MCNC: 8 MG/DL (ref 7–20)
CALCIUM SERPL-MCNC: 8.6 MG/DL (ref 8.4–10.2)
CALCIUM SERPL-MCNC: 8.9 MG/DL (ref 8.4–10.2)
CHLORIDE SERPL-SCNC: 106 MMOL/L (ref 98–110)
CHLORIDE SERPL-SCNC: 106 MMOL/L (ref 98–110)
CO2 SERPL-SCNC: 26 MMOL/L (ref 21–32)
CO2 SERPL-SCNC: 26 MMOL/L (ref 21–32)
CREAT SERPL-MCNC: 0.54 MG/DL (ref 0.66–1.25)
CREAT SERPL-MCNC: 0.58 MG/DL (ref 0.66–1.25)
CREAT/UREA NIT SERPL: 14 (ref 12–20)
CREAT/UREA NIT SERPL: 15 (ref 12–20)
EOSINOPHIL # BLD AUTO: 0.03 X10(3)/MCL (ref 0.04–0.54)
EOSINOPHIL NFR BLD AUTO: 0.6 % (ref 0.7–7)
ERYTHROCYTE [DISTWIDTH] IN BLOOD BY AUTOMATED COUNT: 13.1 %
ETHANOL BLD-MCNC: 0.44 G/DL
ETHANOL SERPL-MCNC: 439 MG/DL
GFR SERPLBLD CREATININE-BSD FMLA CKD-EPI: >90 MLS/MIN/1.73/M2
GFR SERPLBLD CREATININE-BSD FMLA CKD-EPI: >90 MLS/MIN/1.73/M2
GLOBULIN SER-MCNC: 4.1 GM/DL (ref 2–3.9)
GLOBULIN SER-MCNC: 4.8 GM/DL (ref 2–3.9)
GLUCOSE SERPL-MCNC: 101 MG/DL (ref 70–115)
GLUCOSE SERPL-MCNC: 99 MG/DL (ref 70–115)
HCT VFR BLD AUTO: 42.1 % (ref 36–52)
HGB BLD-MCNC: 14.8 G/DL (ref 13–18)
IMM GRANULOCYTES # BLD AUTO: 0.01 X10(3)/MCL (ref 0–0.03)
IMM GRANULOCYTES NFR BLD AUTO: 0.2 % (ref 0–0.5)
INR PPP: 1.1
LYMPHOCYTES # BLD AUTO: 3.2 X10(3)/MCL (ref 1.32–3.57)
LYMPHOCYTES NFR BLD AUTO: 64.3 % (ref 20–55)
MCH RBC QN AUTO: 29 PG (ref 27–34)
MCHC RBC AUTO-ENTMCNC: 35.2 G/DL (ref 31–37)
MCV RBC AUTO: 82.4 FL (ref 79–99)
MONOCYTES # BLD AUTO: 0.58 X10(3)/MCL (ref 0.3–0.82)
MONOCYTES NFR BLD AUTO: 11.6 % (ref 4.7–12.5)
NEUTROPHILS # BLD AUTO: 1.1 X10(3)/MCL (ref 1.78–5.38)
NEUTROPHILS NFR BLD AUTO: 22.1 % (ref 37–73)
NRBC BLD AUTO-RTO: 0 %
PLATELET # BLD AUTO: 215 X10(3)/MCL (ref 140–371)
PMV BLD AUTO: 11.2 FL (ref 9.4–12.4)
POTASSIUM SERPL-SCNC: 3.2 MMOL/L (ref 3.5–5.1)
POTASSIUM SERPL-SCNC: 5 MMOL/L (ref 3.5–5.1)
PROT SERPL-MCNC: 10.1 GM/DL (ref 6.3–8.2)
PROT SERPL-MCNC: 9.1 GM/DL (ref 6.3–8.2)
PROTHROMBIN TIME: 11 SECONDS (ref 9.3–11.9)
RBC # BLD AUTO: 5.11 X10(6)/MCL (ref 4–6)
SODIUM SERPL-SCNC: 146 MMOL/L (ref 135–145)
SODIUM SERPL-SCNC: 150 MMOL/L (ref 135–145)
WBC # SPEC AUTO: 4.98 X10(3)/MCL (ref 4–11.5)

## 2024-01-08 PROCEDURE — 85025 COMPLETE CBC W/AUTO DIFF WBC: CPT | Performed by: FAMILY MEDICINE

## 2024-01-08 PROCEDURE — 80053 COMPREHEN METABOLIC PANEL: CPT | Performed by: FAMILY MEDICINE

## 2024-01-08 PROCEDURE — 96360 HYDRATION IV INFUSION INIT: CPT

## 2024-01-08 PROCEDURE — 36415 COLL VENOUS BLD VENIPUNCTURE: CPT | Performed by: FAMILY MEDICINE

## 2024-01-08 PROCEDURE — 82077 ASSAY SPEC XCP UR&BREATH IA: CPT | Performed by: FAMILY MEDICINE

## 2024-01-08 PROCEDURE — 99284 EMERGENCY DEPT VISIT MOD MDM: CPT | Mod: 25

## 2024-01-08 PROCEDURE — 85610 PROTHROMBIN TIME: CPT | Performed by: FAMILY MEDICINE

## 2024-01-08 PROCEDURE — 25000003 PHARM REV CODE 250: Performed by: FAMILY MEDICINE

## 2024-01-08 PROCEDURE — 85730 THROMBOPLASTIN TIME PARTIAL: CPT | Performed by: FAMILY MEDICINE

## 2024-01-08 RX ADMIN — SODIUM CHLORIDE 1000 ML: 9 INJECTION, SOLUTION INTRAVENOUS at 09:01

## 2024-01-08 NOTE — TELEPHONE ENCOUNTER
Went to ER today and the ER doctor called Adelina with the [facility] treatment center, who than called us. She wanted to inform that patient was seen in the ER here in [place] and was intoxicated.  The ER doctor told her he was drunk as az skunk.  He has not had his factor 9 in at least a month.    She also said that he needs orthopedic surgery but can't do that until his teet are fixed.    She asked if there was any type of treatment center around her or an intervention type of thing that he could get into.  Mostly just wanted you aware of his ER visit today.  They did mail out his factor 9 to him today and he should get it tomorrow.  And was also workinmg on getting him more.

## 2024-01-08 NOTE — ED PROVIDER NOTES
"Encounter Date: 1/8/2024       History     Chief Complaint   Patient presents with    Alcohol Intoxication     Pt reports "I am a hemophiliac . I havent gotten my shot and I need it. I need my factor 9. Put my on that helicopter. I been partying too. I am drunk." Pt reports "my right arm dont work;" function of all limbs noted. Pt c/o right sided facial swelling; minimal swelling noted. Pt rambling. Pt capillary refill <3. Pt acyanotic. No acute distress noted.       Patient presents to the emergency room very intoxicated.  Slurring of speech and a little confused, denies head injury but asking for us to get him his BeneFIX.  Says he is out and he is demanding his shot now.  I see him in the past and he has hemophilia B, is a daily heavy alcohol abuser and hepatitis-C patient, he is supposed to get BeneFIX shots every 10 days.    The history is provided by the patient.     Review of patient's allergies indicates:   Allergen Reactions    Aspirin Other (See Comments)     Thins blood  Other reaction(s): exacerbates bleeding  ASA may exacerbate bleeding       Past Medical History:   Diagnosis Date    Allergy     seasonal    Anxiety     Daily consumption of alcohol     Elevated liver enzymes     Epilepsy, unspecified, not intractable, with status epilepticus     GERD (gastroesophageal reflux disease)     H/O hemophilia B     Hepatitis C virus infection resolved after antiviral drug therapy     Hypertension     Insomnia     Non-compliant behavior     Obesity, unspecified      Past Surgical History:   Procedure Laterality Date    ABDOMINAL SURGERY  2008    bilateral knee scope  1984    ESOPHAGOGASTRODUODENOSCOPY  10/22/2021     Family History   Problem Relation Age of Onset    Hypertension Mother     Hypertension Father      Social History     Tobacco Use    Smoking status: Every Day     Current packs/day: 1.00     Types: Cigarettes     Passive exposure: Current    Smokeless tobacco: Never   Substance Use Topics    Alcohol " use: Yes     Comment: beer daily    Drug use: Yes     Types: Marijuana     Comment: daily     Review of Systems   Constitutional:  Negative for fever.   HENT:  Negative for sore throat.    Respiratory:  Negative for shortness of breath.    Cardiovascular:  Negative for chest pain.   Gastrointestinal:  Negative for nausea.   Genitourinary:  Negative for dysuria.   Musculoskeletal:  Negative for back pain.   Skin:  Negative for rash.   Neurological:  Negative for weakness.   Hematological:  Does not bruise/bleed easily.   All other systems reviewed and are negative.      Physical Exam     Initial Vitals [01/08/24 0834]   BP Pulse Resp Temp SpO2   (!) 149/101 (!) 121 18 97.7 °F (36.5 °C) 99 %      MAP       --         Physical Exam    Nursing note and vitals reviewed.  Constitutional: Vital signs are normal. He is cooperative.  Non-toxic appearance. He does not appear ill.   Patient has a strong alcohol odor   HENT:   Head: Normocephalic and atraumatic.   Eyes: Conjunctivae and lids are normal.   Neck: Trachea normal. Neck supple.   Cardiovascular:  Normal rate and regular rhythm.  No extrasystoles are present.          Pulmonary/Chest: Breath sounds normal.   Abdominal: Abdomen is soft. There is no abdominal tenderness.   Musculoskeletal:         General: Normal range of motion.      Cervical back: Neck supple.     Neurological: He is alert and oriented to person, place, and time. He has normal strength. No cranial nerve deficit or sensory deficit. He displays a negative Romberg sign.   Skin: Skin is warm, dry and intact. Capillary refill takes less than 2 seconds.   Psychiatric: He has a normal mood and affect. His speech is normal and behavior is normal. He is not actively hallucinating. He is attentive.         ED Course   Procedures  Labs Reviewed   COMPREHENSIVE METABOLIC PANEL - Abnormal; Notable for the following components:       Result Value    Sodium Level 146 (*)     Creatinine 0.54 (*)     Protein Total  10.1 (*)     Albumin Level 5.3 (*)     Globulin 4.8 (*)     Bilirubin Total 1.6 (*)     Alanine Aminotransferase 68 (*)     Aspartate Aminotransferase 130 (*)     Anion Gap 14.0 (*)     All other components within normal limits   CBC WITH DIFFERENTIAL - Abnormal; Notable for the following components:    Neut % 22.1 (*)     Lymph % 64.3 (*)     Eos % 0.6 (*)     Neut # 1.10 (*)     Eos # 0.03 (*)     All other components within normal limits   CBC W/ AUTO DIFFERENTIAL    Narrative:     The following orders were created for panel order CBC Auto Differential.  Procedure                               Abnormality         Status                     ---------                               -----------         ------                     CBC with Differential[[id number]]       Abnormal            Final result                 Please view results for these tests on the individual orders.   PROTIME-INR   APTT   ALCOHOL,MEDICAL (ETHANOL)   COMPREHENSIVE METABOLIC PANEL          Imaging Results    None          Medications   sodium chloride 0.9% bolus 1,000 mL 1,000 mL (0 mLs Intravenous Stopped 1/8/24 1004)     Medical Decision Making  Called the patient's mother, she not have the BeneFIX.  It was supposed to be delivered on Saturday, but she says that it was never delivered.    Discussed with Adelina at the hemophiliac clinic in [place], she is a nurse with Dr. Amado, the patient's hematologist.  The dose of BeneFIX 8000 units, and needs to be given as soon as possible.    Do not carry BeneFIX here, will call around to local hospitals for help.       While awaiting pharmacy's efforts to obtain BeneFIX, pt upset, as he just 'wants a shot'.  Refusing to wait, tried to walk out of facility with iv and pump/pole.  Walked back to room and pt signed out AMA.    Amount and/or Complexity of Data Reviewed  Labs: ordered.                                      Clinical Impression:  Final diagnoses:  [F10.920] Alcoholic intoxication  without complication  [D67] Hemophilia B (Primary)          ED Disposition Condition    AMA Dion Cheek MD  01/08/24 1010

## 2024-01-08 NOTE — ED NOTES
Spoke with Sherri in pharmacy r/t getting Benefix from another [facility] facility; will keep me updated.

## 2024-01-08 NOTE — ED NOTES
"Contacted pt mother. Pt mother does not have benefix on hand; "they were supposed to deliver us some one Saturday but it never came in!"   "

## 2024-01-16 ENCOUNTER — TELEPHONE (OUTPATIENT)
Dept: FAMILY MEDICINE | Facility: CLINIC | Age: 44
End: 2024-01-16
Payer: MEDICAID

## 2024-01-16 DIAGNOSIS — R56.9 SEIZURE: ICD-10-CM

## 2024-01-16 RX ORDER — LEVETIRACETAM 500 MG/1
500 TABLET ORAL 2 TIMES DAILY
Qty: 60 TABLET | Refills: 0 | Status: SHIPPED | OUTPATIENT
Start: 2024-01-16 | End: 2024-02-19 | Stop reason: SDUPTHER

## 2024-01-19 ENCOUNTER — OFFICE VISIT (OUTPATIENT)
Dept: FAMILY MEDICINE | Facility: CLINIC | Age: 44
End: 2024-01-19
Payer: MEDICAID

## 2024-01-19 VITALS
HEART RATE: 107 BPM | HEIGHT: 68 IN | TEMPERATURE: 98 F | SYSTOLIC BLOOD PRESSURE: 138 MMHG | DIASTOLIC BLOOD PRESSURE: 76 MMHG | WEIGHT: 169 LBS | OXYGEN SATURATION: 97 % | BODY MASS INDEX: 25.61 KG/M2

## 2024-01-19 DIAGNOSIS — I10 PRIMARY HYPERTENSION: ICD-10-CM

## 2024-01-19 DIAGNOSIS — K92.2 GASTROINTESTINAL HEMORRHAGE, UNSPECIFIED GASTROINTESTINAL HEMORRHAGE TYPE: ICD-10-CM

## 2024-01-19 DIAGNOSIS — G40.909 SEIZURE DISORDER: ICD-10-CM

## 2024-01-19 DIAGNOSIS — R46.89 NON-COMPLIANT BEHAVIOR: ICD-10-CM

## 2024-01-19 DIAGNOSIS — F10.20 UNCOMPLICATED ALCOHOL DEPENDENCE: ICD-10-CM

## 2024-01-19 DIAGNOSIS — G47.00 INSOMNIA, UNSPECIFIED TYPE: ICD-10-CM

## 2024-01-19 DIAGNOSIS — M36.2 HEMOPHILIC ARTHROPATHY: Primary | ICD-10-CM

## 2024-01-19 DIAGNOSIS — K21.9 GASTROESOPHAGEAL REFLUX DISEASE, UNSPECIFIED WHETHER ESOPHAGITIS PRESENT: ICD-10-CM

## 2024-01-19 DIAGNOSIS — D67 HEMOPHILIA B: ICD-10-CM

## 2024-01-19 DIAGNOSIS — D66 HEMOPHILIC ARTHROPATHY: Primary | ICD-10-CM

## 2024-01-19 PROCEDURE — 99214 OFFICE O/P EST MOD 30 MIN: CPT | Mod: ,,, | Performed by: NURSE PRACTITIONER

## 2024-01-19 PROCEDURE — 3078F DIAST BP <80 MM HG: CPT | Mod: CPTII,,, | Performed by: NURSE PRACTITIONER

## 2024-01-19 PROCEDURE — 3008F BODY MASS INDEX DOCD: CPT | Mod: CPTII,,, | Performed by: NURSE PRACTITIONER

## 2024-01-19 PROCEDURE — 4010F ACE/ARB THERAPY RXD/TAKEN: CPT | Mod: CPTII,,, | Performed by: NURSE PRACTITIONER

## 2024-01-19 PROCEDURE — 3075F SYST BP GE 130 - 139MM HG: CPT | Mod: CPTII,,, | Performed by: NURSE PRACTITIONER

## 2024-01-19 RX ORDER — IRBESARTAN 75 MG/1
75 TABLET ORAL DAILY
Qty: 90 TABLET | Refills: 3 | Status: CANCELLED | OUTPATIENT
Start: 2024-01-19

## 2024-01-19 RX ORDER — FACTOR IX HUMAN RECOMBINANT 500 UNIT
VIAL (EA) INTRAVENOUS
COMMUNITY
Start: 2024-01-11

## 2024-01-19 NOTE — PROGRESS NOTES
Patient ID: [id number]     Chief Complaint: Follow-up and Hypertension      HPI:     Obinna Burton is a 43 y.o. male in the office for Follow-up and Hypertension  He's also following up on a recent ER visit on 1/8/24.  He had been drinking and was intoxicated.  He presented to ER because he is a hemophiliac and hand not received his factor 9 injection in the mail like he normally does and wanted ER to give him the BeneFix injection.   He has been out of medication but did not  or PCP to notify anyone.  He is a daily alcohol abuser and hemophilia B patient managed by Heme/Onc Dr. Laura Rosales. Since going to the ER, his medication has been shipped and administered.     Upon further discussion about his behavior, he drinks partly because of the pain he's in.  He agrees that its possible if his pain was controlled, he might not drink. He reports being in need of knee replacements but that it can not be done until he sees a dental hygienist/dentist and has his dental problems taken care of.  He is a high risk for no show and it is difficult to find a dentist willing to see a patient with Hemophilia.  Specialist has suggested looking in [place].      Has not been taking any medications consistently including irbesartan, amlodipine, and keppra.     Past Medical History:  has a past medical history of Allergy, Anxiety, Daily consumption of alcohol, Elevated liver enzymes, Epilepsy, unspecified, not intractable, with status epilepticus, GERD (gastroesophageal reflux disease), H/O hemophilia B, Hepatitis C virus infection resolved after antiviral drug therapy, Hypertension, Insomnia, Non-compliant behavior, and Obesity, unspecified.    Social History:  reports that he has been smoking cigarettes. He has been exposed to tobacco smoke. He has never used smokeless tobacco. He reports current alcohol use. He reports current drug use. Drug: Marijuana.    Current Outpatient Medications   Medication  "Instructions    amLODIPine (NORVASC) 5 mg, Oral, Daily    BENEFIX 500 unit injection Intravenous    cetirizine (ZYRTEC) 10 mg, Oral, Nightly    factor IX rec, albumin fusion (IDELVION) 3,500 (+/-) unit SolR 8,000 Units, Intravenous, Every 10 days    irbesartan (AVAPRO) 75 mg, Oral, Daily    levETIRAcetam (KEPPRA) 1,000 mg, Oral, 2 times daily    mirtazapine (REMERON) 7.5 mg, Oral, Daily    pantoprazole (PROTONIX) 40 mg, Oral, Daily    vitamin D (VITAMIN D3) 6,000 Units, Oral, Daily       Patient is allergic to aspirin.     Patient Care Team:  Sari Cooper FNP-C as PCP - General (Family Medicine)  Laura Rosales MD as Consulting Physician (Hematology and Oncology)     Subjective     Review of Systems    See HPI     Objective     Visit Vitals  /76 (BP Location: Right arm, Patient Position: Sitting, BP Method: Medium (Manual))   Pulse 107   Temp 98.1 °F (36.7 °C) (Temporal)   Ht 5' 7.99" (1.727 m)   Wt 76.7 kg (169 lb)   SpO2 97%   BMI 25.70 kg/m²       Physical Exam    Assessment & Plan:     1. Hemophilic arthropathy  Overview:  In need of bilateral knee replacements according to patient pending dental evaluation.      Assessment & Plan:  Could consider treating with Suboxone for chronic pain if he was able to stop drinking.  [facility]       2. Seizure disorder  Overview:  On keppra 1000 mg bid.  Referred to neurology 5/2023.  Scheduled with Dr. Martins on 10/19/23 and no showed.       3. Uncomplicated alcohol dependence  Assessment & Plan:  Referring to PMHNP in office for evaluation and assistance to stop drinking so that he can be more compliant with specialist appointments and explore the possibility of knee replacements vs pain control.       4. Primary hypertension  Overview:  On amlodipine 5 mg, irbesartan 75 mg daily.     Assessment & Plan:  Well controlled.   Continue current meds  Low Sodium Diet (DASH Diet - Less than 2 grams of " sodium per day).  Monitor blood pressure daily and log. Report consistent numbers greater than 140/90.  Maintain healthy weight with goal BMI <30. Exercise 30 minutes per day, 5 days per week.  Smoking cessation encouraged to aid in BP reduction.        5. Hemophilia B    6. Insomnia, unspecified type  Overview:  On mirtazapine 7.5 mg at bedtime.    Assessment & Plan:  Continue medication      7. Gastroesophageal reflux disease, unspecified whether esophagitis present    8. Gastrointestinal hemorrhage, unspecified gastrointestinal hemorrhage type  Overview:  Referred to Gastroenterology 5/2023 per heme/onc request for a specialist closer to home in hopes that he'd be more compliant with keeping appointments.      Assessment & Plan:  Follow up on GI referral.       9. Non-compliant behavior       Follow up in about 3 months (around 4/19/2024). In addition to their next scheduled appointment, the patient has also been instructed to follow up on as needed basis.     Future Appointments   Date Time Provider Department Center   4/2/2024  8:00 AM Jeffery Velez PMHNP [facility]  [facility]   4/24/2024  8:30 AM Sari Cooper FNP-C [facility]        Lab Frequency Next Occurrence   Ambulatory referral/consult to Gastroenterology Once 05/30/2023   Ambulatory referral/consult to Neurology Once 05/30/2023   Ambulatory referral/consult to Psychiatry Once 02/08/2024     I spent a total of 38 minutes on the day of the visit.  This includes face to face time and non-face to face time preparing to see the patient (eg, review of tests), obtaining and/or reviewing separately obtained history, documenting clinical information in the electronic or other health record, independently interpreting results and communicating results to the patient/family/caregiver, or care coordinator.

## 2024-02-01 ENCOUNTER — TELEPHONE (OUTPATIENT)
Dept: FAMILY MEDICINE | Facility: CLINIC | Age: 44
End: 2024-02-01
Payer: MEDICAID

## 2024-02-01 DIAGNOSIS — Z78.9 DAILY CONSUMPTION OF ALCOHOL: Primary | ICD-10-CM

## 2024-02-01 NOTE — TELEPHONE ENCOUNTER
Spoke with mother, told her Sari had said that he needed to see [facility] for that, referral sent to [facility].

## 2024-02-14 ENCOUNTER — TELEPHONE (OUTPATIENT)
Dept: FAMILY MEDICINE | Facility: CLINIC | Age: 44
End: 2024-02-14
Payer: MEDICAID

## 2024-02-14 NOTE — TELEPHONE ENCOUNTER
Adelina from the hemophilia center called and said that he has had 6 seizures in the last week.  She stated that an ambulance had been called for one but he refused or was not taken to the ER.  Said he needs a neurologist.      Referral has been sent for a referral already.

## 2024-02-19 ENCOUNTER — OFFICE VISIT (OUTPATIENT)
Dept: FAMILY MEDICINE | Facility: CLINIC | Age: 44
End: 2024-02-19
Payer: MEDICAID

## 2024-02-19 VITALS
HEART RATE: 98 BPM | OXYGEN SATURATION: 98 % | BODY MASS INDEX: 26.13 KG/M2 | DIASTOLIC BLOOD PRESSURE: 70 MMHG | TEMPERATURE: 97 F | WEIGHT: 172.38 LBS | HEIGHT: 68 IN | SYSTOLIC BLOOD PRESSURE: 128 MMHG

## 2024-02-19 DIAGNOSIS — R56.9 SEIZURE: Primary | ICD-10-CM

## 2024-02-19 DIAGNOSIS — R46.89 NON-COMPLIANT BEHAVIOR: ICD-10-CM

## 2024-02-19 DIAGNOSIS — F10.20 UNCOMPLICATED ALCOHOL DEPENDENCE: ICD-10-CM

## 2024-02-19 PROCEDURE — 4010F ACE/ARB THERAPY RXD/TAKEN: CPT | Mod: CPTII,,, | Performed by: NURSE PRACTITIONER

## 2024-02-19 PROCEDURE — 1159F MED LIST DOCD IN RCRD: CPT | Mod: CPTII,,, | Performed by: NURSE PRACTITIONER

## 2024-02-19 PROCEDURE — 3078F DIAST BP <80 MM HG: CPT | Mod: CPTII,,, | Performed by: NURSE PRACTITIONER

## 2024-02-19 PROCEDURE — 3074F SYST BP LT 130 MM HG: CPT | Mod: CPTII,,, | Performed by: NURSE PRACTITIONER

## 2024-02-19 PROCEDURE — 3008F BODY MASS INDEX DOCD: CPT | Mod: CPTII,,, | Performed by: NURSE PRACTITIONER

## 2024-02-19 PROCEDURE — 1160F RVW MEDS BY RX/DR IN RCRD: CPT | Mod: CPTII,,, | Performed by: NURSE PRACTITIONER

## 2024-02-19 PROCEDURE — 99214 OFFICE O/P EST MOD 30 MIN: CPT | Mod: ,,, | Performed by: NURSE PRACTITIONER

## 2024-02-19 RX ORDER — LEVETIRACETAM 500 MG/1
1000 TABLET ORAL 2 TIMES DAILY
Qty: 360 TABLET | Refills: 3 | Status: SHIPPED | OUTPATIENT
Start: 2024-02-19

## 2024-02-19 NOTE — PROGRESS NOTES
Patient ID: [id number]     Chief Complaint: Seizures (F/U seizures)      HPI:     Obinna Burton is a 43 y.o. male in the office for Seizures (F/U seizures)    He is following up after increase of Keppra for reported continued seizures.  Historically they have been controlled on Keppra but he also has a history of noncompliance and alcohol abuse.  He was last seen in the office 01/19/2024. Since that time he has continued to have seizures and even reported that the ambulance had been called but he chose not to go to the hospital.  He notes that he has bit his tongue and it's just now healing.  He has been advised numerous times to stop drinking alcohol.  He has admitted that part of his drinking is due to his chronic pain.      Past Medical History:  has a past medical history of Allergy, Anxiety, Daily consumption of alcohol, Elevated liver enzymes, Epilepsy, unspecified, not intractable, with status epilepticus, GERD (gastroesophageal reflux disease), H/O hemophilia B, Hepatitis C virus infection resolved after antiviral drug therapy, Hypertension, Insomnia, Non-compliant behavior, and Obesity, unspecified.    Social History:  reports that he has been smoking cigarettes. He has been exposed to tobacco smoke. He has never used smokeless tobacco. He reports current alcohol use. He reports current drug use. Drug: Marijuana.    Current Outpatient Medications   Medication Instructions    amLODIPine (NORVASC) 5 mg, Oral, Daily    BENEFIX 500 unit injection Intravenous    cetirizine (ZYRTEC) 10 mg, Oral, Nightly    factor IX rec, albumin fusion (IDELVION) 3,500 (+/-) unit SolR 8,000 Units, Intravenous, Every 10 days    irbesartan (AVAPRO) 75 mg, Oral, Daily    levETIRAcetam (KEPPRA) 1,000 mg, Oral, 2 times daily    mirtazapine (REMERON) 7.5 mg, Oral, Daily    pantoprazole (PROTONIX) 40 mg, Oral, Daily    vitamin D (VITAMIN D3) 6,000 Units, Oral, Daily       Patient is allergic to aspirin.     Patient Care Team:  Allison  "AILEEN Abreu as PCP - General (Family Medicine)  Laura Rosales MD as Consulting Physician (Hematology and Oncology)     Subjective     Review of Systems    See HPI     Objective     Visit Vitals  /70 (BP Location: Right arm)   Pulse 98   Temp 97.2 °F (36.2 °C) (Temporal)   Ht 5' 8" (1.727 m)   Wt 78.2 kg (172 lb 6.4 oz)   SpO2 98%   BMI 26.21 kg/m²       Physical Exam  Vitals reviewed.   Constitutional:       Appearance: Normal appearance.   Cardiovascular:      Rate and Rhythm: Normal rate and regular rhythm.      Heart sounds: Normal heart sounds.   Pulmonary:      Effort: Pulmonary effort is normal.      Breath sounds: Normal breath sounds.   Musculoskeletal:      Right knee: Deformity present. Decreased range of motion.      Left knee: Deformity present. Decreased range of motion.   Skin:     General: Skin is warm and dry.   Neurological:      Mental Status: He is alert and oriented to person, place, and time.   Psychiatric:         Mood and Affect: Mood normal.         Assessment & Plan:     1. Seizure  Seizures not controlled, increase Keppra to 1000 mg twice a day.  RTC 2 weeks for follow up.   Stressed importance of avoiding alcohol.  -     levETIRAcetam (KEPPRA) 500 MG Tab; Take 2 tablets (1,000 mg total) by mouth 2 (two) times daily.  Dispense: 360 tablet; Refill: 3    2. Non-compliant behavior    3. Uncomplicated alcohol dependence  Encouraged him to see PMHNP for assistance with stopping alcohol in order to treat his chronic pain.  He declines.      Follow up in about 2 weeks (around 3/4/2024) for seizures. In addition to their next scheduled appointment, the patient has also been instructed to follow up on as needed basis.     Future Appointments   Date Time Provider Department Center   4/2/2024  8:00 AM Jeffery Velez, PMHNP [facility]   4/24/2024  8:30 AM [facility]      "

## 2024-03-15 ENCOUNTER — HOSPITAL ENCOUNTER (EMERGENCY)
Facility: HOSPITAL | Age: 44
Discharge: HOME | End: 2024-03-15
Attending: STUDENT IN AN ORGANIZED HEALTH CARE EDUCATION/TRAINING PROGRAM
Payer: MEDICAID

## 2024-03-15 VITALS
OXYGEN SATURATION: 96 % | HEIGHT: 68 IN | BODY MASS INDEX: 25.28 KG/M2 | SYSTOLIC BLOOD PRESSURE: 163 MMHG | TEMPERATURE: 98 F | DIASTOLIC BLOOD PRESSURE: 103 MMHG | WEIGHT: 166.81 LBS | RESPIRATION RATE: 18 BRPM | HEART RATE: 100 BPM

## 2024-03-15 DIAGNOSIS — E87.29 ALCOHOLIC KETOACIDOSIS: ICD-10-CM

## 2024-03-15 DIAGNOSIS — F10.920 ALCOHOLIC INTOXICATION WITHOUT COMPLICATION: Primary | ICD-10-CM

## 2024-03-15 DIAGNOSIS — A41.9 SEPSIS: ICD-10-CM

## 2024-03-15 DIAGNOSIS — R56.9 SEIZURE: ICD-10-CM

## 2024-03-15 LAB
ABS NEUT CALC (OHS): 0.73 X10(3)/MCL (ref 2.1–9.2)
ALBUMIN SERPL-MCNC: 4.6 G/DL (ref 3.4–5)
ALBUMIN SERPL-MCNC: 5 G/DL (ref 3.4–5)
ALBUMIN/GLOB SERPL: 1.1 RATIO
ALBUMIN/GLOB SERPL: 1.2 RATIO
ALP SERPL-CCNC: 81 UNIT/L (ref 50–144)
ALP SERPL-CCNC: 99 UNIT/L (ref 50–144)
ALT SERPL-CCNC: 37 UNIT/L (ref 1–45)
ALT SERPL-CCNC: 37 UNIT/L (ref 1–45)
AMPHET UR QL SCN: NEGATIVE
ANION GAP SERPL CALC-SCNC: 18 MEQ/L (ref 2–13)
ANION GAP SERPL CALC-SCNC: 28 MEQ/L (ref 2–13)
APPEARANCE UR: CLEAR
AST SERPL-CCNC: 105 UNIT/L (ref 17–59)
AST SERPL-CCNC: 110 UNIT/L (ref 17–59)
BARBITURATE SCN PRESENT UR: NEGATIVE
BASE EXCESS BLD CALC-SCNC: -9.7 MMOL/L (ref -2–2)
BASOPHILS # BLD AUTO: 0.02 X10(3)/MCL (ref 0.01–0.08)
BASOPHILS NFR BLD AUTO: 0.7 % (ref 0.1–1.2)
BENZODIAZ UR QL SCN: NEGATIVE
BILIRUB SERPL-MCNC: 0.8 MG/DL (ref 0–1)
BILIRUB SERPL-MCNC: 0.9 MG/DL (ref 0–1)
BILIRUB UR QL STRIP.AUTO: NEGATIVE
BLOOD GAS SAMPLE TYPE (OHS): ABNORMAL
BUN SERPL-MCNC: 10 MG/DL (ref 7–20)
BUN SERPL-MCNC: 8 MG/DL (ref 7–20)
CALCIUM SERPL-MCNC: 7.8 MG/DL (ref 8.4–10.2)
CALCIUM SERPL-MCNC: 8.6 MG/DL (ref 8.4–10.2)
CANNABINOIDS UR QL SCN: POSITIVE
CHLORIDE SERPL-SCNC: 101 MMOL/L (ref 98–110)
CHLORIDE SERPL-SCNC: 103 MMOL/L (ref 98–110)
CO2 SERPL-SCNC: 13 MMOL/L (ref 21–32)
CO2 SERPL-SCNC: 18 MMOL/L (ref 21–32)
COCAINE UR QL SCN: NEGATIVE
COHGB MFR BLDA: 3.7 % (ref 1–1.5)
COLOR UR AUTO: YELLOW
CREAT SERPL-MCNC: 0.63 MG/DL (ref 0.66–1.25)
CREAT SERPL-MCNC: 0.78 MG/DL (ref 0.66–1.25)
CREAT/UREA NIT SERPL: 13 (ref 12–20)
CREAT/UREA NIT SERPL: 13 (ref 12–20)
EOSINOPHIL # BLD AUTO: 0 X10(3)/MCL (ref 0.04–0.54)
EOSINOPHIL NFR BLD AUTO: 0 % (ref 0.7–7)
ERYTHROCYTE [DISTWIDTH] IN BLOOD BY AUTOMATED COUNT: 17.1 %
ERYTHROCYTE [DISTWIDTH] IN BLOOD BY AUTOMATED COUNT: 17.2 %
ETHANOL BLD-MCNC: 0.39 G/DL
ETHANOL SERPL-MCNC: 387 MG/DL
GFR SERPLBLD CREATININE-BSD FMLA CKD-EPI: >90 MLS/MIN/1.73/M2
GFR SERPLBLD CREATININE-BSD FMLA CKD-EPI: >90 MLS/MIN/1.73/M2
GLOBULIN SER-MCNC: 4.1 GM/DL (ref 2–3.9)
GLOBULIN SER-MCNC: 4.3 GM/DL (ref 2–3.9)
GLUCOSE SERPL-MCNC: 159 MG/DL (ref 70–115)
GLUCOSE SERPL-MCNC: 92 MG/DL (ref 70–115)
GLUCOSE UR QL STRIP.AUTO: NEGATIVE
HCO3 BLDA-SCNC: 16.5 MMOL/L (ref 23–28)
HCT VFR BLD AUTO: 33.9 % (ref 36–52)
HCT VFR BLD AUTO: 36.2 % (ref 36–52)
HGB BLD-MCNC: 11.5 G/DL (ref 13–18)
HGB BLD-MCNC: 12 G/DL (ref 13–18)
IMM GRANULOCYTES # BLD AUTO: 0.03 X10(3)/MCL (ref 0–0.03)
IMM GRANULOCYTES NFR BLD AUTO: 1.1 % (ref 0–0.5)
INFLUENZA A (OHS): NEGATIVE
INFLUENZA B (OHS): NEGATIVE
INHALED O2 CONCENTRATION: 21 %
IP (OHS): 0 CMH2O
KETONES UR QL STRIP.AUTO: >=80
LACTATE SERPL-SCNC: 2.3 MMOL/L (ref 0.4–2)
LACTATE SERPL-SCNC: 2.5 MMOL/L (ref 0.4–2)
LACTATE SERPL-SCNC: 3 MMOL/L (ref 0.4–2)
LEUKOCYTE ESTERASE UR QL STRIP.AUTO: NEGATIVE
LYMPHOCYTES # BLD AUTO: 1.15 X10(3)/MCL (ref 1.32–3.57)
LYMPHOCYTES NFR BLD AUTO: 42.1 % (ref 20–55)
LYMPHOCYTES NFR BLD MANUAL: 0.82 X10(3)/MCL
LYMPHOCYTES NFR BLD MANUAL: 52 % (ref 20–55)
MAGNESIUM SERPL-MCNC: 1.6 MG/DL (ref 1.8–2.4)
MAGNESIUM SERPL-MCNC: 2.1 MG/DL (ref 1.8–2.4)
MCH RBC QN AUTO: 26.4 PG (ref 27–34)
MCH RBC QN AUTO: 26.5 PG (ref 27–34)
MCHC RBC AUTO-ENTMCNC: 33.1 G/DL (ref 31–37)
MCHC RBC AUTO-ENTMCNC: 33.9 G/DL (ref 31–37)
MCV RBC AUTO: 77.9 FL (ref 79–99)
MCV RBC AUTO: 79.9 FL (ref 79–99)
METHADONE UR QL SCN: NEGATIVE
METHGB MFR BLDA: 0.3 % (ref 0–1.5)
MONOCYTES # BLD AUTO: 0.41 X10(3)/MCL (ref 0.3–0.82)
MONOCYTES NFR BLD AUTO: 15 % (ref 4.7–12.5)
MONOCYTES NFR BLD MANUAL: 0.03 X10(3)/MCL (ref 0.1–1.3)
MONOCYTES NFR BLD MANUAL: 2 % (ref 0–10)
NEUTROPHILS # BLD AUTO: 1.12 X10(3)/MCL (ref 1.78–5.38)
NEUTROPHILS NFR BLD AUTO: 41.1 % (ref 37–73)
NEUTROPHILS NFR BLD MANUAL: 46 % (ref 37–73)
NITRITE UR QL STRIP.AUTO: NEGATIVE
NRBC BLD AUTO-RTO: 0 %
NRBC BLD AUTO-RTO: 0 %
OPIATES UR QL SCN: NEGATIVE
OXYGEN DEVICE BLOOD GAS (OHS): ABNORMAL
PAW @ PEAK INSP FLOW SETTING VENT: 0 CMH20
PCO2 BLDA: 37.1 MMHG (ref 41–51)
PCP UR QL: NEGATIVE
PH BLDA: 7.26 [PH] (ref 7.31–7.41)
PH UR STRIP.AUTO: 6 [PH]
PH UR: 6 [PH] (ref 3–11)
PLATELET # BLD AUTO: 125 X10(3)/MCL (ref 140–371)
PLATELET # BLD AUTO: 99 X10(3)/MCL (ref 140–371)
PLATELET # BLD EST: ADEQUATE 10*3/UL
PMV BLD AUTO: 10.5 FL (ref 9.4–12.4)
PMV BLD AUTO: 10.8 FL (ref 9.4–12.4)
PO2 BLDA: 55.7 MMHG (ref 30–40)
POTASSIUM SERPL-SCNC: 4.1 MMOL/L (ref 3.5–5.1)
POTASSIUM SERPL-SCNC: 4.2 MMOL/L (ref 3.5–5.1)
PROT SERPL-MCNC: 8.7 GM/DL (ref 6.3–8.2)
PROT SERPL-MCNC: 9.3 GM/DL (ref 6.3–8.2)
PROT UR QL STRIP.AUTO: ABNORMAL
RBC # BLD AUTO: 4.35 X10(6)/MCL (ref 4–6)
RBC # BLD AUTO: 4.53 X10(6)/MCL (ref 4–6)
RBC UR QL AUTO: NEGATIVE
SAO2 % BLDA: 80.3 % (ref 60–80)
SODIUM SERPL-SCNC: 139 MMOL/L (ref 135–145)
SODIUM SERPL-SCNC: 142 MMOL/L (ref 135–145)
SP GR UR STRIP.AUTO: >=1.03 (ref 1–1.03)
UROBILINOGEN UR STRIP-ACNC: 0.2
WBC # SPEC AUTO: 1.58 X10(3)/MCL (ref 4–11.5)
WBC # SPEC AUTO: 2.73 X10(3)/MCL (ref 4–11.5)

## 2024-03-15 PROCEDURE — 96361 HYDRATE IV INFUSION ADD-ON: CPT

## 2024-03-15 PROCEDURE — 80053 COMPREHEN METABOLIC PANEL: CPT | Performed by: STUDENT IN AN ORGANIZED HEALTH CARE EDUCATION/TRAINING PROGRAM

## 2024-03-15 PROCEDURE — 82803 BLOOD GASES ANY COMBINATION: CPT

## 2024-03-15 PROCEDURE — 25000003 PHARM REV CODE 250: Performed by: FAMILY MEDICINE

## 2024-03-15 PROCEDURE — 96372 THER/PROPH/DIAG INJ SC/IM: CPT | Performed by: STUDENT IN AN ORGANIZED HEALTH CARE EDUCATION/TRAINING PROGRAM

## 2024-03-15 PROCEDURE — 96367 TX/PROPH/DG ADDL SEQ IV INF: CPT

## 2024-03-15 PROCEDURE — 63600175 PHARM REV CODE 636 W HCPCS: Performed by: STUDENT IN AN ORGANIZED HEALTH CARE EDUCATION/TRAINING PROGRAM

## 2024-03-15 PROCEDURE — 83735 ASSAY OF MAGNESIUM: CPT | Performed by: FAMILY MEDICINE

## 2024-03-15 PROCEDURE — 83605 ASSAY OF LACTIC ACID: CPT | Performed by: STUDENT IN AN ORGANIZED HEALTH CARE EDUCATION/TRAINING PROGRAM

## 2024-03-15 PROCEDURE — 85027 COMPLETE CBC AUTOMATED: CPT | Performed by: FAMILY MEDICINE

## 2024-03-15 PROCEDURE — 83735 ASSAY OF MAGNESIUM: CPT | Performed by: STUDENT IN AN ORGANIZED HEALTH CARE EDUCATION/TRAINING PROGRAM

## 2024-03-15 PROCEDURE — 99900035 HC TECH TIME PER 15 MIN (STAT)

## 2024-03-15 PROCEDURE — 96375 TX/PRO/DX INJ NEW DRUG ADDON: CPT

## 2024-03-15 PROCEDURE — 85027 COMPLETE CBC AUTOMATED: CPT | Performed by: STUDENT IN AN ORGANIZED HEALTH CARE EDUCATION/TRAINING PROGRAM

## 2024-03-15 PROCEDURE — 63600175 PHARM REV CODE 636 W HCPCS: Performed by: FAMILY MEDICINE

## 2024-03-15 PROCEDURE — 96368 THER/DIAG CONCURRENT INF: CPT

## 2024-03-15 PROCEDURE — 80307 DRUG TEST PRSMV CHEM ANLYZR: CPT | Performed by: STUDENT IN AN ORGANIZED HEALTH CARE EDUCATION/TRAINING PROGRAM

## 2024-03-15 PROCEDURE — 96365 THER/PROPH/DIAG IV INF INIT: CPT

## 2024-03-15 PROCEDURE — 80177 DRUG SCRN QUAN LEVETIRACETAM: CPT | Performed by: STUDENT IN AN ORGANIZED HEALTH CARE EDUCATION/TRAINING PROGRAM

## 2024-03-15 PROCEDURE — 25000003 PHARM REV CODE 250: Performed by: STUDENT IN AN ORGANIZED HEALTH CARE EDUCATION/TRAINING PROGRAM

## 2024-03-15 PROCEDURE — 80053 COMPREHEN METABOLIC PANEL: CPT | Mod: 91 | Performed by: FAMILY MEDICINE

## 2024-03-15 PROCEDURE — 96366 THER/PROPH/DIAG IV INF ADDON: CPT

## 2024-03-15 PROCEDURE — 81003 URINALYSIS AUTO W/O SCOPE: CPT | Mod: 59 | Performed by: FAMILY MEDICINE

## 2024-03-15 PROCEDURE — 99285 EMERGENCY DEPT VISIT HI MDM: CPT | Mod: 25

## 2024-03-15 PROCEDURE — 87400 INFLUENZA A/B EACH AG IA: CPT | Performed by: FAMILY MEDICINE

## 2024-03-15 PROCEDURE — 82077 ASSAY SPEC XCP UR&BREATH IA: CPT | Performed by: STUDENT IN AN ORGANIZED HEALTH CARE EDUCATION/TRAINING PROGRAM

## 2024-03-15 PROCEDURE — 83605 ASSAY OF LACTIC ACID: CPT | Performed by: FAMILY MEDICINE

## 2024-03-15 RX ORDER — MAGNESIUM SULFATE HEPTAHYDRATE 40 MG/ML
2 INJECTION, SOLUTION INTRAVENOUS
Status: COMPLETED | OUTPATIENT
Start: 2024-03-15 | End: 2024-03-15

## 2024-03-15 RX ORDER — ZIPRASIDONE MESYLATE 20 MG/ML
20 INJECTION, POWDER, LYOPHILIZED, FOR SOLUTION INTRAMUSCULAR
Status: COMPLETED | OUTPATIENT
Start: 2024-03-15 | End: 2024-03-15

## 2024-03-15 RX ORDER — DEXTROSE MONOHYDRATE AND SODIUM CHLORIDE 5; .9 G/100ML; G/100ML
1000 INJECTION, SOLUTION INTRAVENOUS
Status: COMPLETED | OUTPATIENT
Start: 2024-03-15 | End: 2024-03-15

## 2024-03-15 RX ORDER — FOLIC ACID 1 MG/1
1 TABLET ORAL
Status: COMPLETED | OUTPATIENT
Start: 2024-03-15 | End: 2024-03-15

## 2024-03-15 RX ORDER — MORPHINE SULFATE 4 MG/ML
4 INJECTION, SOLUTION INTRAMUSCULAR; INTRAVENOUS
Status: COMPLETED | OUTPATIENT
Start: 2024-03-15 | End: 2024-03-15

## 2024-03-15 RX ORDER — METHOCARBAMOL 500 MG/1
500 TABLET, FILM COATED ORAL
Status: COMPLETED | OUTPATIENT
Start: 2024-03-15 | End: 2024-03-15

## 2024-03-15 RX ORDER — LEVETIRACETAM 500 MG/5ML
2000 INJECTION, SOLUTION, CONCENTRATE INTRAVENOUS
Status: COMPLETED | OUTPATIENT
Start: 2024-03-15 | End: 2024-03-15

## 2024-03-15 RX ORDER — ONDANSETRON HYDROCHLORIDE 2 MG/ML
4 INJECTION, SOLUTION INTRAVENOUS
Status: COMPLETED | OUTPATIENT
Start: 2024-03-15 | End: 2024-03-15

## 2024-03-15 RX ADMIN — SODIUM CHLORIDE 1000 ML: 9 INJECTION, SOLUTION INTRAVENOUS at 08:03

## 2024-03-15 RX ADMIN — METHOCARBAMOL 500 MG: 500 TABLET ORAL at 03:03

## 2024-03-15 RX ADMIN — LEVETIRACETAM 2000 MG: 100 INJECTION, SOLUTION INTRAVENOUS at 03:03

## 2024-03-15 RX ADMIN — MORPHINE SULFATE 4 MG: 4 INJECTION, SOLUTION INTRAMUSCULAR; INTRAVENOUS at 08:03

## 2024-03-15 RX ADMIN — DEXTROSE AND SODIUM CHLORIDE 1000 ML: 5; 900 INJECTION, SOLUTION INTRAVENOUS at 04:03

## 2024-03-15 RX ADMIN — FOLIC ACID 1 MG: 1 TABLET ORAL at 04:03

## 2024-03-15 RX ADMIN — ZIPRASIDONE MESYLATE 20 MG: 20 INJECTION, POWDER, LYOPHILIZED, FOR SOLUTION INTRAMUSCULAR at 03:03

## 2024-03-15 RX ADMIN — MAGNESIUM SULFATE HEPTAHYDRATE 2 G: 40 INJECTION, SOLUTION INTRAVENOUS at 06:03

## 2024-03-15 RX ADMIN — SODIUM CHLORIDE 1000 ML: 9 INJECTION, SOLUTION INTRAVENOUS at 03:03

## 2024-03-15 RX ADMIN — THIAMINE HYDROCHLORIDE 100 MG: 100 INJECTION, SOLUTION INTRAMUSCULAR; INTRAVENOUS at 04:03

## 2024-03-15 RX ADMIN — ONDANSETRON 4 MG: 2 INJECTION INTRAMUSCULAR; INTRAVENOUS at 08:03

## 2024-03-15 NOTE — Clinical Note
"Obinna Pena" Josephine was seen and treated in our emergency department on 3/15/2024.  He may return to work on 03/17/2024.       If you have any questions or concerns, please don't hesitate to call.      ED RN    "

## 2024-03-15 NOTE — ED PROVIDER NOTES
Encounter Date: 3/15/2024       History     Chief Complaint   Patient presents with    Seizures     Pt states that he has been having seizures since 4 am. Unable to give more details. Complains of muscle twitching also. Patient has history of hemophilia     HPI  Patient is a 43-year-old male past medical history alcohol abuse, hemophilia B, anxiety, who presents to ER after patient states he has been having seizures since 4:00 a.m..  Patient unable to provide clear details but states he was drinking all day.  He endorses being a daily drinker.  He denies any known trauma.  Patient denies any other complaints at this time.  Review of patient's allergies indicates:   Allergen Reactions    Aspirin Other (See Comments)     Thins blood  Other reaction(s): exacerbates bleeding  ASA may exacerbate bleeding       Past Medical History:   Diagnosis Date    Allergy     seasonal    Anxiety     Daily consumption of alcohol     Elevated liver enzymes     Epilepsy, unspecified, not intractable, with status epilepticus     GERD (gastroesophageal reflux disease)     H/O hemophilia B     Hepatitis C virus infection resolved after antiviral drug therapy     Hypertension     Insomnia     Non-compliant behavior     Obesity, unspecified      Past Surgical History:   Procedure Laterality Date    ABDOMINAL SURGERY  2008    bilateral knee scope  1984    ESOPHAGOGASTRODUODENOSCOPY  10/22/2021     Family History   Problem Relation Age of Onset    Hypertension Mother     Hypertension Father      Social History     Tobacco Use    Smoking status: Every Day     Current packs/day: 1.00     Types: Cigarettes     Passive exposure: Current    Smokeless tobacco: Never   Substance Use Topics    Alcohol use: Yes     Comment: beer daily    Drug use: Yes     Types: Marijuana     Comment: daily     Review of Systems   Constitutional:  Negative for fever.   HENT:  Negative for sore throat.    Eyes:  Negative for visual disturbance.   Respiratory:  Negative  for shortness of breath.    Cardiovascular:  Negative for chest pain.   Gastrointestinal:  Negative for nausea.   Endocrine: Negative for polyuria.   Genitourinary:  Negative for dysuria.   Musculoskeletal:  Negative for back pain.   Skin:  Negative for rash.   Neurological:  Positive for seizures. Negative for weakness.   Hematological:  Does not bruise/bleed easily.   All other systems reviewed and are negative.      Physical Exam     Initial Vitals [03/15/24 1413]   BP Pulse Resp Temp SpO2   139/85 92 17 98.5 °F (36.9 °C) 99 %      MAP       --         Physical Exam    Nursing note and vitals reviewed.  Constitutional: Vital signs are normal. He appears well-developed and well-nourished. He is not diaphoretic. He is active.  Non-toxic appearance. He does not appear ill. No distress.   HENT:   Head: Normocephalic and atraumatic.   Eyes: Conjunctivae are normal. Pupils are equal, round, and reactive to light. Right conjunctiva is not injected. Left conjunctiva is not injected.   Neck: Trachea normal. Neck supple.   Normal range of motion.   Full passive range of motion without pain.     Cardiovascular:  Normal rate, regular rhythm, S1 normal, S2 normal, intact distal pulses and normal pulses.           Pulmonary/Chest: Breath sounds normal. No respiratory distress. He has no wheezes.   Abdominal: Abdomen is soft. Bowel sounds are normal. There is no abdominal tenderness.   Midline old postoperative scar, noted to abdomen, well healed.   Musculoskeletal:         General: No tenderness or edema. Normal range of motion.      Cervical back: Full passive range of motion without pain, normal range of motion and neck supple. No rigidity.      Right lower leg: No swelling. No edema.      Left lower leg: No swelling. No edema.     Neurological: He is alert.   Patient alert, responsive to questioning with frequent prompting.  Moving All extremities.  GCS of 14 secondary to intoxication(confusion), slurred speech.   Skin:  Skin is warm and dry. Capillary refill takes less than 2 seconds.         ED Course   Procedures  Labs Reviewed   COMPREHENSIVE METABOLIC PANEL - Abnormal; Notable for the following components:       Result Value    Carbon Dioxide 13 (*)     Protein Total 9.3 (*)     Globulin 4.3 (*)     Aspartate Aminotransferase 110 (*)     Anion Gap 28.0 (*)     All other components within normal limits   LACTIC ACID, PLASMA - Abnormal; Notable for the following components:    Lactic Acid Level 3.0 (*)     All other components within normal limits   MAGNESIUM - Abnormal; Notable for the following components:    Magnesium Level 1.60 (*)     All other components within normal limits   DRUG SCREEN, URINE (BEAKER) - Abnormal; Notable for the following components:    Cannabinoids, Urine Positive (*)     All other components within normal limits    Narrative:     Cut off concentrations:    Amphetamines - 1000 ng/ml  Barbiturates - 200 ng/ml  Benzodiazepine - 200 ng/ml  Cannabinoids (THC) - 50 ng/ml  Cocaine - 300 ng/ml  Fentanyl - 1.0 ng/ml  MDMA - 500 ng/ml  Opiates - 300 ng/ml   Phencyclidine (PCP) - 25 ng/ml  Methadone - 300 ng/ml      False negatives may result form substances such as bleach added to urine.  False positives may result for the presence of a substance with similar chemical structure to the drug or its metabolite.    This test provides only a PRELIMINARY analytical test result. A more specific alternate chemical method must be used in order to obtain a confirmed analytical result. Gas chromatography/mass spectrometry (GC/MS) is the preferred confirmatory method. Other chemical confirmation methods are available. Clinical consideration and professional judgement should be applied to any drug of abuse test result, particularly when preliminary positive results are used.    Positive results will be confirmed only at the physicians request. Unconfirmed screening results are to be used only for medical purposes  (treatment).          ALCOHOL,MEDICAL (ETHANOL) - Abnormal; Notable for the following components:    Ethanol Level 387.0 (*)     Alcohol, Legal Level 0.387 (*)     All other components within normal limits   CBC WITH DIFFERENTIAL - Abnormal; Notable for the following components:    WBC 1.58 (*)     Hgb 12.0 (*)     MCH 26.5 (*)     Platelet 125 (*)     All other components within normal limits   MANUAL DIFFERENTIAL - Abnormal; Notable for the following components:    Neutrophils Abs Calc 0.7268 (*)     Monocytes Abs 0.0316 (*)     All other components within normal limits   LACTIC ACID, PLASMA - Abnormal; Notable for the following components:    Lactic Acid Level 2.5 (*)     All other components within normal limits   BLOOD GAS - Abnormal; Notable for the following components:    pH, Blood gas 7.260 (*)     pCO2, Blood gas 37.1 (*)     pO2, Blood gas 55.7 (*)     CO Hgb 3.7 (*)     sO2, Blood gas 80.3 (*)     HCO3, Blood gas 16.5 (*)     Base Excess, Blood gas -9.70 (*)     All other components within normal limits   URINALYSIS, REFLEX TO URINE CULTURE - Abnormal; Notable for the following components:    Protein, UA Trace (*)     Ketones, UA >=80 (*)     All other components within normal limits    Narrative:      URINE STABILITY IS 2 HOURS AT ROOM TEMP OR    SIX HOURS REFRIGERATED. PERFORMING TESTING ON    SPECIMENS GREATER THAN THIS AGE MAY AFFECT THE    FOLLOWING TESTS:    PH          SPECIFIC GRAVITY           BLOOD    CLARITY     BILIRUBIN               UROBILINOGEN   COMPREHENSIVE METABOLIC PANEL - Abnormal; Notable for the following components:    Carbon Dioxide 18 (*)     Glucose Level 159 (*)     Creatinine 0.63 (*)     Calcium Level Total 7.8 (*)     Protein Total 8.7 (*)     Globulin 4.1 (*)     Aspartate Aminotransferase 105 (*)     Anion Gap 18.0 (*)     All other components within normal limits   LACTIC ACID, PLASMA - Abnormal; Notable for the following components:    Lactic Acid Level 2.3 (*)     All  other components within normal limits   CBC WITH DIFFERENTIAL - Abnormal; Notable for the following components:    WBC 2.73 (*)     Hgb 11.5 (*)     Hct 33.9 (*)     MCV 77.9 (*)     MCH 26.4 (*)     Platelet 99 (*)     Mono % 15.0 (*)     Eos % 0.0 (*)     Lymph # 1.15 (*)     Neut # 1.12 (*)     Eos # 0.00 (*)     IG% 1.1 (*)     All other components within normal limits   RAPID INFLUENZA A/B - Normal   MAGNESIUM - Normal   BLOOD SMEAR MICROSCOPIC EXAM (OLG) - Normal   CBC W/ AUTO DIFFERENTIAL    Narrative:     The following orders were created for panel order CBC auto differential.  Procedure                               Abnormality         Status                     ---------                               -----------         ------                     CBC with Differential[[id number]]       Abnormal            Final result               Manual Differential[[id number]]         Abnormal            Final result                 Please view results for these tests on the individual orders.   CBC W/ AUTO DIFFERENTIAL    Narrative:     The following orders were created for panel order CBC auto differential.  Procedure                               Abnormality         Status                     ---------                               -----------         ------                     CBC with Differential[[id number]]       Abnormal            Final result                 Please view results for these tests on the individual orders.   LEVETIRACETAM  (KEPPRA) LEVEL   PATH REVIEW OF BLOOD SMEAR          Imaging Results              X-Ray Chest 1 View (Final result)  Result time 03/15/24 21:07:56      Final result by Zachariah Siddiqui MD (03/15/24 21:07:56)                   Impression:      No acute disease is seen      Electronically signed by: Zachariah Siddiqui MD  Date:    03/15/2024  Time:    21:07               Narrative:    EXAMINATION:  XR CHEST 1 VIEW    CLINICAL HISTORY:  Sepsis, unspecified organism    TECHNIQUE:  Single  frontal view of the chest was performed.    COMPARISON:  07/19/2020    FINDINGS:  There is elevation the right hemidiaphragm.  No infiltrates are seen.  Heart size is within normal limits.  Costophrenic angles are clear.                                       CT Head Without Contrast (Final result)  Result time 03/15/24 16:02:34      Final result by Yossi Lyn MD (03/15/24 16:02:34)                   Impression:        1. Chronic findings of minimal atrophy and an old nonhemorrhagic lacunar infarct in the deep white matter of the right posterior parietal lobe.  No acute intracranial abnormality.    n/a    Category: n/a    The following dose reduction techniques are used for all CT at [facility]:    1.   Automated exposure control.    2.   Adjustment of the mA and/or kV according to patient size.    3.   Use of iterative reconstruction technique.      Electronically signed by: Yossi Lyn  Date:    03/15/2024  Time:    16:02               Narrative:    EXAMINATION:  CT HEAD WITHOUT CONTRAST    CLINICAL HISTORY:  Seizure, history of hemophilia, ETOH positive;    TECHNIQUE:  Low dose axial images were obtained through the head.  Coronal and sagittal reformations were also performed. Contrast was not administered.    COMPARISON:  05/18/2023    FINDINGS:  Multiplanar imaging through the head/brain was performed.Minimal generalized atrophy is present.  A previously seen small pole nonhemorrhagic lacunar infarct is again noted in the white posterior parietal deep white matter.  I see no intraparynchymal masses, hemorhagic lesions, or dominant wedge shaped infarcts. I see no extraxial masses or abnormal fluid collections.                                       Medications   levETIRAcetam injection 2,000 mg (2,000 mg Intravenous Given 3/15/24 1517)   sodium chloride 0.9% bolus 1,000 mL 1,000 mL (0 mLs Intravenous Stopped 3/15/24 1628)   ziprasidone injection 20 mg (20 mg Intramuscular Given  "3/15/24 1516)   methocarbamoL tablet 500 mg (500 mg Oral Given 3/15/24 1536)   dextrose 5 % and 0.9 % NaCl infusion (0 mLs Intravenous Stopped 3/15/24 1851)   thiamine (B-1) 100 mg in dextrose 5 % (D5W) 100 mL IVPB (0 mg Intravenous Stopped 3/15/24 1714)   folic acid tablet 1 mg (1 mg Oral Given 3/15/24 1645)   magnesium sulfate 2g in water 50mL IVPB (premix) (0 g Intravenous Stopped 3/15/24 2024)   ondansetron injection 4 mg (4 mg Intravenous Given 3/15/24 2033)   morphine injection 4 mg (4 mg Intravenous Given 3/15/24 2033)   sodium chloride 0.9% bolus 1,000 mL 1,000 mL (1,000 mLs Intravenous New Bag 3/15/24 2034)     Medical Decision Making  Patient is a 43-year-old male past medical history alcohol abuse, hemophilia B, anxiety, who presents to ER after patient states he has been having seizures since 4:00 a.m..    Differential diagnosis includes but not limited to:  Intracranial hemorrhage, intracranial mass, electrolyte derangements, alcohol intoxication, alcoholic ketoacidosis, polysubstance abuse    Patient on arrival was noted to endorsed seizures however was conversant throughout these "seizure-like episodes".  Secondary to patient's ETOH intoxication, history of hemophilia B, we obtained labs in addition to CT head, toxicology screen, ETOH level, lactic acid.  Patient requiring sedatives as he would repeatedly flop around in bed making examination and imaging difficult. Patient labs returned noted no lola anemia as H&H were 36/12.  Patient was noted to have white count of 1.58.  This is apparently new for the patient on chart review.  There were no gross electrolyte derangements noted.  Patient with AST/ALT of 110/37 consistent with alcoholic hepatitis.  Toxicology screen was positive for cannabinoids.  ETOH level was elevated at 387.  Lactic acid was elevated at 3.0.  G noted pH of 7.26.  Patient was also noted to have an anion gap of 28.  On chart review patient has several presentations to ER for " similar events, sometimes leaving AMA.  Awaiting patient to achieve clinical sobriety and assess his willingness to stay overnight.  Repeat lactic acid pending after IV fluids, D5 normal saline infusion currently going, patient given IV thiamine, folic acid.  CT head imaging negative for any acute intracranial abnormalities.  Patient signed out to oncoming emergency medicine physician for disposition.    Dominic Duran M.D.  Emergency Medicine        Amount and/or Complexity of Data Reviewed  Labs: ordered.  Radiology: ordered.    Risk  Prescription drug management.                                      Clinical Impression:  Final diagnoses:  [F10.920] Alcoholic intoxication without complication (Primary)  [E87.29] Alcoholic ketoacidosis  [A41.9] Sepsis  [R56.9] Seizure          ED Disposition Condition    Discharge Stable          ED Prescriptions    None       Follow-up Information    None          Bolivar Giron MD  03/15/24 3837

## 2024-03-15 NOTE — ED NOTES
Bed: 02 ER  Expected date: 3/15/24  Expected time: 2:05 PM  Means of arrival: Ambulance Service  Comments:

## 2024-03-16 LAB — LEVETIRACETAM SERPL-MCNC: 24.7 MCG/ML (ref 10–40)

## 2024-03-17 LAB — HEMATOLOGIST REVIEW: NORMAL

## 2024-03-20 ENCOUNTER — TELEPHONE (OUTPATIENT)
Dept: FAMILY MEDICINE | Facility: CLINIC | Age: 44
End: 2024-03-20
Payer: MEDICAID

## 2024-03-20 DIAGNOSIS — I10 ESSENTIAL (PRIMARY) HYPERTENSION: ICD-10-CM

## 2024-03-20 PROBLEM — K92.2 GASTROINTESTINAL HEMORRHAGE: Status: ACTIVE | Noted: 2024-03-20

## 2024-03-20 RX ORDER — AMLODIPINE BESYLATE 5 MG/1
5 TABLET ORAL DAILY
Qty: 30 TABLET | Refills: 2 | Status: SHIPPED | OUTPATIENT
Start: 2024-03-20

## 2024-03-20 NOTE — ASSESSMENT & PLAN NOTE
Could consider treating with Suboxone for chronic pain if he was able to stop drinking.  Agreeable for PMHNP jamila for assistance with alcohol dependence.

## 2024-03-20 NOTE — ASSESSMENT & PLAN NOTE
Referring to PMHNP in office for evaluation and assistance to stop drinking so that he can be more compliant with specialist appointments and explore the possibility of knee replacements vs pain control.

## 2024-03-23 PROBLEM — D66 HEMOPHILIC ARTHROPATHY: Status: RESOLVED | Noted: 2023-04-06 | Resolved: 2024-03-23

## 2024-03-23 PROBLEM — M36.2 HEMOPHILIC ARTHROPATHY: Status: RESOLVED | Noted: 2023-04-06 | Resolved: 2024-03-23

## 2024-04-07 ENCOUNTER — HOSPITAL ENCOUNTER (EMERGENCY)
Facility: HOSPITAL | Age: 44
Discharge: HOME | End: 2024-04-08
Payer: MEDICAID

## 2024-04-07 DIAGNOSIS — E87.6 HYPOKALEMIA: ICD-10-CM

## 2024-04-07 DIAGNOSIS — F10.920 ALCOHOLIC INTOXICATION WITHOUT COMPLICATION: Primary | ICD-10-CM

## 2024-04-07 DIAGNOSIS — R56.9 WITNESSED SEIZURE-LIKE ACTIVITY: ICD-10-CM

## 2024-04-07 LAB
ALBUMIN SERPL-MCNC: 5.4 G/DL (ref 3.4–5)
ALBUMIN/GLOB SERPL: 1.2 RATIO
ALP SERPL-CCNC: 71 UNIT/L (ref 50–144)
ALT SERPL-CCNC: 53 UNIT/L (ref 1–45)
ANION GAP SERPL CALC-SCNC: 17 MEQ/L (ref 2–13)
AST SERPL-CCNC: 156 UNIT/L (ref 17–59)
BASOPHILS # BLD AUTO: 0.03 X10(3)/MCL (ref 0.01–0.08)
BASOPHILS NFR BLD AUTO: 0.8 % (ref 0.1–1.2)
BILIRUB SERPL-MCNC: 0.8 MG/DL (ref 0–1)
BUN SERPL-MCNC: 17 MG/DL (ref 7–20)
CALCIUM SERPL-MCNC: 10.4 MG/DL (ref 8.4–10.2)
CHLORIDE SERPL-SCNC: 92 MMOL/L (ref 98–110)
CO2 SERPL-SCNC: 26 MMOL/L (ref 21–32)
CREAT SERPL-MCNC: 0.93 MG/DL (ref 0.66–1.25)
CREAT/UREA NIT SERPL: 18 (ref 12–20)
EOSINOPHIL # BLD AUTO: 0 X10(3)/MCL (ref 0.04–0.54)
EOSINOPHIL NFR BLD AUTO: 0 % (ref 0.7–7)
ERYTHROCYTE [DISTWIDTH] IN BLOOD BY AUTOMATED COUNT: 17.6 %
ETHANOL BLD-MCNC: 0.23 G/DL
ETHANOL SERPL-MCNC: 228 MG/DL
GFR SERPLBLD CREATININE-BSD FMLA CKD-EPI: >90 MLS/MIN/1.73/M2
GLOBULIN SER-MCNC: 4.5 GM/DL (ref 2–3.9)
GLUCOSE SERPL-MCNC: 106 MG/DL (ref 70–115)
HCT VFR BLD AUTO: 33.5 % (ref 36–52)
HGB BLD-MCNC: 11.7 G/DL (ref 13–18)
IMM GRANULOCYTES # BLD AUTO: 0.01 X10(3)/MCL (ref 0–0.03)
IMM GRANULOCYTES NFR BLD AUTO: 0.3 % (ref 0–0.5)
LIPASE SERPL-CCNC: 257 U/L (ref 23–300)
LYMPHOCYTES # BLD AUTO: 1.41 X10(3)/MCL (ref 1.32–3.57)
LYMPHOCYTES NFR BLD AUTO: 38.1 % (ref 20–55)
MCH RBC QN AUTO: 26.4 PG (ref 27–34)
MCHC RBC AUTO-ENTMCNC: 34.9 G/DL (ref 31–37)
MCV RBC AUTO: 75.5 FL (ref 79–99)
MONOCYTES # BLD AUTO: 0.89 X10(3)/MCL (ref 0.3–0.82)
MONOCYTES NFR BLD AUTO: 24.1 % (ref 4.7–12.5)
NEUTROPHILS # BLD AUTO: 1.36 X10(3)/MCL (ref 1.78–5.38)
NEUTROPHILS NFR BLD AUTO: 36.7 % (ref 37–73)
PLATELET # BLD AUTO: 103 X10(3)/MCL (ref 140–371)
PMV BLD AUTO: 11.3 FL (ref 9.4–12.4)
POTASSIUM SERPL-SCNC: 2.7 MMOL/L (ref 3.5–5.1)
PROT SERPL-MCNC: 9.9 GM/DL (ref 6.3–8.2)
RBC # BLD AUTO: 4.44 X10(6)/MCL (ref 4–6)
SODIUM SERPL-SCNC: 135 MMOL/L (ref 135–145)
WBC # SPEC AUTO: 3.7 X10(3)/MCL (ref 4–11.5)

## 2024-04-07 PROCEDURE — 63600175 PHARM REV CODE 636 W HCPCS

## 2024-04-07 PROCEDURE — 83690 ASSAY OF LIPASE: CPT

## 2024-04-07 PROCEDURE — 85025 COMPLETE CBC W/AUTO DIFF WBC: CPT

## 2024-04-07 PROCEDURE — 96374 THER/PROPH/DIAG INJ IV PUSH: CPT

## 2024-04-07 PROCEDURE — 96375 TX/PRO/DX INJ NEW DRUG ADDON: CPT

## 2024-04-07 PROCEDURE — 80053 COMPREHEN METABOLIC PANEL: CPT

## 2024-04-07 PROCEDURE — 82077 ASSAY SPEC XCP UR&BREATH IA: CPT

## 2024-04-07 PROCEDURE — 99284 EMERGENCY DEPT VISIT MOD MDM: CPT | Mod: 25

## 2024-04-07 RX ORDER — LEVETIRACETAM 500 MG/5ML
500 INJECTION, SOLUTION, CONCENTRATE INTRAVENOUS
Status: COMPLETED | OUTPATIENT
Start: 2024-04-07 | End: 2024-04-07

## 2024-04-07 RX ORDER — DIAZEPAM 10 MG/2ML
5 INJECTION INTRAMUSCULAR
Status: COMPLETED | OUTPATIENT
Start: 2024-04-07 | End: 2024-04-07

## 2024-04-07 RX ORDER — POTASSIUM CHLORIDE 20 MEQ/1
40 TABLET, EXTENDED RELEASE ORAL
Status: COMPLETED | OUTPATIENT
Start: 2024-04-08 | End: 2024-04-08

## 2024-04-07 RX ORDER — POTASSIUM CHLORIDE 20 MEQ/1
20 TABLET, EXTENDED RELEASE ORAL 2 TIMES DAILY
Qty: 60 TABLET | Refills: 0 | Status: SHIPPED | OUTPATIENT
Start: 2024-04-07 | End: 2024-05-07

## 2024-04-07 RX ADMIN — LEVETIRACETAM 500 MG: 100 INJECTION, SOLUTION INTRAVENOUS at 11:04

## 2024-04-07 RX ADMIN — DIAZEPAM 5 MG: 5 INJECTION, SOLUTION INTRAMUSCULAR; INTRAVENOUS at 11:04

## 2024-04-08 VITALS
SYSTOLIC BLOOD PRESSURE: 121 MMHG | HEART RATE: 87 BPM | OXYGEN SATURATION: 98 % | TEMPERATURE: 98 F | WEIGHT: 155 LBS | HEIGHT: 68 IN | BODY MASS INDEX: 23.49 KG/M2 | DIASTOLIC BLOOD PRESSURE: 82 MMHG | RESPIRATION RATE: 18 BRPM

## 2024-04-08 LAB
ANISOCYTOSIS BLD QL SMEAR: ABNORMAL
HYPOCHROMIA BLD QL SMEAR: ABNORMAL
MICROCYTES BLD QL SMEAR: ABNORMAL
PLATELET # BLD EST: ABNORMAL 10*3/UL
POIKILOCYTOSIS BLD QL SMEAR: ABNORMAL
RBC MORPH BLD: ABNORMAL
TARGETS BLD QL SMEAR: ABNORMAL

## 2024-04-08 PROCEDURE — 25000003 PHARM REV CODE 250

## 2024-04-08 RX ADMIN — POTASSIUM CHLORIDE 40 MEQ: 1500 TABLET, EXTENDED RELEASE ORAL at 12:04

## 2024-04-08 NOTE — ED PROVIDER NOTES
"Encounter Date: 4/7/2024       History     Chief Complaint   Patient presents with    Other     Arrives via EMS [facility] with c/o "having a seizure in the back of the  car" upon arrest prior to arrival. EMS states that pt coherently speaks and answers questions and then 'shakes.' Pt AAO x4 during triage and answers questions appropriately.  Pt is accompanied by [place] [employer] de[employer].     43-year-old male brought in by the police for "having seizures".  He was being arrested when these problems started.  He also wants his factor 9 shot.  He smells strongly of alcohol.  EMS reports that his seizure stopped when they told the patient to stop moving.  Additionally, there was no postictal confusion.  He is on Keppra for a seizure disorder.  He has a history of alcoholism and factor 9 deficiency.    The history is provided by the patient, the police, the EMS personnel and medical records.     Review of patient's allergies indicates:   Allergen Reactions    Aspirin Other (See Comments)     Thins blood  Other reaction(s): exacerbates bleeding  ASA may exacerbate bleeding       Past Medical History:   Diagnosis Date    Allergy     seasonal    Anxiety     Daily consumption of alcohol     Elevated liver enzymes     Epilepsy, unspecified, not intractable, with status epilepticus     GERD (gastroesophageal reflux disease)     H/O hemophilia B     Hepatitis C virus infection resolved after antiviral drug therapy     Hypertension     Insomnia     Non-compliant behavior     Obesity, unspecified      Past Surgical History:   Procedure Laterality Date    ABDOMINAL SURGERY  2008    bilateral knee scope  1984    ESOPHAGOGASTRODUODENOSCOPY  10/22/2021     Family History   Problem Relation Age of Onset    Hypertension Mother     Hypertension Father      Social History     Tobacco Use    Smoking status: Every Day     Current packs/day: 1.00     Types: Cigarettes     Passive exposure: Current    Smokeless tobacco: Never   Substance Use " Topics    Alcohol use: Yes     Comment: beer daily    Drug use: Yes     Types: Marijuana     Comment: daily     Review of Systems   Constitutional:  Negative for fever.   HENT:  Negative for sore throat.    Respiratory:  Negative for shortness of breath.    Cardiovascular:  Negative for chest pain.   Gastrointestinal:  Negative for nausea.   Genitourinary:  Negative for dysuria.   Musculoskeletal:  Negative for back pain.   Skin:  Negative for rash.   Neurological:  Positive for seizures. Negative for weakness.   Hematological:  Does not bruise/bleed easily.   Psychiatric/Behavioral:  The patient is nervous/anxious.    All other systems reviewed and are negative.      Physical Exam     Initial Vitals [04/07/24 2317]   BP Pulse Resp Temp SpO2   (!) 133/92 77 18 98.3 °F (36.8 °C) 98 %      MAP       --         Physical Exam    Nursing note and vitals reviewed.  Constitutional: Vital signs are normal. He appears well-developed and well-nourished. He is cooperative.   Patient's smells strongly of alcohol   HENT:   Head: Normocephalic and atraumatic.   Eyes: Conjunctivae, EOM and lids are normal. Pupils are equal, round, and reactive to light.   Neck: Trachea normal. Neck supple.   Cardiovascular:  Regular rhythm, normal heart sounds and intact distal pulses.           Musculoskeletal:      Cervical back: Normal and neck supple.      Lumbar back: Normal.     Lymphadenopathy:     He has no cervical adenopathy.   Neurological: He is alert and oriented to person, place, and time. He has normal strength. Coordination normal.   Skin: Skin is warm, dry and intact. Capillary refill takes less than 2 seconds.   Psychiatric: He has a normal mood and affect. His speech is normal and behavior is normal. Judgment and thought content normal. Cognition and memory are normal.         ED Course   Procedures  Labs Reviewed   ALCOHOL,MEDICAL (ETHANOL) - Abnormal; Notable for the following components:       Result Value    Ethanol Level  "228.0 (*)     Alcohol, Legal Level 0.228 (*)     All other components within normal limits   COMPREHENSIVE METABOLIC PANEL - Abnormal; Notable for the following components:    Potassium Level 2.7 (*)     Chloride 92 (*)     Calcium Level Total 10.4 (*)     Protein Total 9.9 (*)     Albumin Level 5.4 (*)     Globulin 4.5 (*)     Alanine Aminotransferase 53 (*)     Aspartate Aminotransferase 156 (*)     Anion Gap 17.0 (*)     All other components within normal limits   CBC WITH DIFFERENTIAL - Abnormal; Notable for the following components:    WBC 3.70 (*)     Hgb 11.7 (*)     Hct 33.5 (*)     MCV 75.5 (*)     MCH 26.4 (*)     Platelet 103 (*)     Neut % 36.7 (*)     Mono % 24.1 (*)     Eos % 0.0 (*)     Neut # 1.36 (*)     Mono # 0.89 (*)     Eos # 0.00 (*)     All other components within normal limits   LIPASE - Normal   CBC W/ AUTO DIFFERENTIAL    Narrative:     The following orders were created for panel order CBC auto differential.  Procedure                               Abnormality         Status                     ---------                               -----------         ------                     CBC with Differential[[id number]]       Abnormal            Final result                 Please view results for these tests on the individual orders.   URINALYSIS, REFLEX TO URINE CULTURE   DRUG SCREEN, URINE (BEAKER)   BLOOD SMEAR MICROSCOPIC EXAM (OLG)          Imaging Results    None          Medications   potassium chloride SA CR tablet 40 mEq (has no administration in time range)   diazePAM injection 5 mg (5 mg Intravenous Given 4/7/24 2327)   levETIRAcetam injection 500 mg (500 mg Intravenous Given 4/7/24 2330)     Medical Decision Making  "Seizures", smells of alcohol  Differential diagnosis:  Recurrent seizures, PNES, panic attack, alcohol intoxication, substance abuse  Valium, Keppra  Labs    Amount and/or Complexity of Data Reviewed  Labs: ordered. Decision-making details documented in ED " Course.    Risk  Prescription drug management.               ED Course as of 04/07/24 2357   Sun Apr 07, 2024   2345 CBC auto differential(!)  No change versus 3 weeks ago [TM]   2352 Ethanol(!)  Consistent with alcohol intoxication [TM]   2352 Comprehensive metabolic panel(!)  Hypokalemia [TM]      ED Course User Index  [TM] Harry Edgar MD                           Clinical Impression:  Final diagnoses:  [F10.920] Alcoholic intoxication without complication (Primary)  [R56.9] Witnessed seizure-like activity  [E87.6] Hypokalemia          ED Disposition Condition    Discharge Stable          ED Prescriptions       Medication Sig Dispense Start Date End Date Auth. Provider    potassium chloride SA (K-DUR,KLOR-CON) 20 MEQ tablet Take 1 tablet (20 mEq total) by mouth 2 (two) times daily. 60 tablet 4/7/2024 5/7/2024 Harry Edgar MD          Follow-up Information       Follow up With Specialties Details Why Contact Info    Sari Cooper, FNP-C Family Medicine Call today  [address]  [address]  [facility]  [place] [address]  [phone]               Harry Edgar MD  04/07/24 5909

## 2024-04-24 ENCOUNTER — PATIENT MESSAGE (OUTPATIENT)
Dept: FAMILY MEDICINE | Facility: CLINIC | Age: 44
End: 2024-04-24

## 2024-04-24 ENCOUNTER — OFFICE VISIT (OUTPATIENT)
Dept: FAMILY MEDICINE | Facility: CLINIC | Age: 44
End: 2024-04-24
Payer: MEDICAID

## 2024-04-24 ENCOUNTER — DOCUMENTATION ONLY (OUTPATIENT)
Dept: FAMILY MEDICINE | Facility: CLINIC | Age: 44
End: 2024-04-24

## 2024-04-24 VITALS
WEIGHT: 161.19 LBS | DIASTOLIC BLOOD PRESSURE: 78 MMHG | TEMPERATURE: 98 F | BODY MASS INDEX: 24.43 KG/M2 | HEART RATE: 79 BPM | HEIGHT: 68 IN | OXYGEN SATURATION: 99 % | SYSTOLIC BLOOD PRESSURE: 120 MMHG

## 2024-04-24 DIAGNOSIS — I10 PRIMARY HYPERTENSION: ICD-10-CM

## 2024-04-24 DIAGNOSIS — E87.6 HYPOKALEMIA: ICD-10-CM

## 2024-04-24 DIAGNOSIS — F10.20 UNCOMPLICATED ALCOHOL DEPENDENCE: Primary | ICD-10-CM

## 2024-04-24 DIAGNOSIS — Z78.9 DAILY CONSUMPTION OF ALCOHOL: ICD-10-CM

## 2024-04-24 DIAGNOSIS — G47.10 DAYTIME HYPERSOMNIA: ICD-10-CM

## 2024-04-24 DIAGNOSIS — R56.9 SEIZURE: ICD-10-CM

## 2024-04-24 LAB
ANION GAP SERPL CALC-SCNC: 14 MEQ/L (ref 2–13)
BASOPHILS # BLD AUTO: 0.03 X10(3)/MCL (ref 0.01–0.08)
BASOPHILS NFR BLD AUTO: 0.5 % (ref 0.1–1.2)
BUN SERPL-MCNC: 15 MG/DL (ref 7–20)
CALCIUM SERPL-MCNC: 10.1 MG/DL (ref 8.4–10.2)
CHLORIDE SERPL-SCNC: 89 MMOL/L (ref 98–110)
CO2 SERPL-SCNC: 28 MMOL/L (ref 21–32)
CREAT SERPL-MCNC: 0.47 MG/DL (ref 0.66–1.25)
CREAT/UREA NIT SERPL: 32 (ref 12–20)
EOSINOPHIL # BLD AUTO: 0.01 X10(3)/MCL (ref 0.04–0.54)
EOSINOPHIL NFR BLD AUTO: 0.2 % (ref 0.7–7)
ERYTHROCYTE [DISTWIDTH] IN BLOOD BY AUTOMATED COUNT: 17.5 %
GFR SERPLBLD CREATININE-BSD FMLA CKD-EPI: >90 MLS/MIN/1.73/M2
GLUCOSE SERPL-MCNC: 108 MG/DL (ref 70–115)
HCT VFR BLD AUTO: 25.8 % (ref 36–52)
HGB BLD-MCNC: 9.1 G/DL (ref 13–18)
IMM GRANULOCYTES # BLD AUTO: 0.02 X10(3)/MCL (ref 0–0.03)
IMM GRANULOCYTES NFR BLD AUTO: 0.3 % (ref 0–0.5)
LYMPHOCYTES # BLD AUTO: 1.05 X10(3)/MCL (ref 1.32–3.57)
LYMPHOCYTES NFR BLD AUTO: 17.6 % (ref 20–55)
MCH RBC QN AUTO: 26.6 PG (ref 27–34)
MCHC RBC AUTO-ENTMCNC: 35.3 G/DL (ref 31–37)
MCV RBC AUTO: 75.4 FL (ref 79–99)
MONOCYTES # BLD AUTO: 0.75 X10(3)/MCL (ref 0.3–0.82)
MONOCYTES NFR BLD AUTO: 12.6 % (ref 4.7–12.5)
NEUTROPHILS # BLD AUTO: 4.1 X10(3)/MCL (ref 1.78–5.38)
NEUTROPHILS NFR BLD AUTO: 68.8 % (ref 37–73)
NRBC BLD AUTO-RTO: 0 %
PLATELET # BLD AUTO: 166 X10(3)/MCL (ref 140–371)
PMV BLD AUTO: 11.7 FL (ref 9.4–12.4)
POTASSIUM SERPL-SCNC: 3.7 MMOL/L (ref 3.5–5.1)
RBC # BLD AUTO: 3.42 X10(6)/MCL (ref 4–6)
SODIUM SERPL-SCNC: 131 MMOL/L (ref 135–145)
WBC # SPEC AUTO: 5.96 X10(3)/MCL (ref 4–11.5)

## 2024-04-24 PROCEDURE — 85025 COMPLETE CBC W/AUTO DIFF WBC: CPT | Performed by: NURSE PRACTITIONER

## 2024-04-24 PROCEDURE — 99214 OFFICE O/P EST MOD 30 MIN: CPT | Mod: ,,, | Performed by: NURSE PRACTITIONER

## 2024-04-24 PROCEDURE — 3008F BODY MASS INDEX DOCD: CPT | Mod: CPTII,,, | Performed by: NURSE PRACTITIONER

## 2024-04-24 PROCEDURE — 1159F MED LIST DOCD IN RCRD: CPT | Mod: CPTII,,, | Performed by: NURSE PRACTITIONER

## 2024-04-24 PROCEDURE — 3074F SYST BP LT 130 MM HG: CPT | Mod: CPTII,,, | Performed by: NURSE PRACTITIONER

## 2024-04-24 PROCEDURE — 3078F DIAST BP <80 MM HG: CPT | Mod: CPTII,,, | Performed by: NURSE PRACTITIONER

## 2024-04-24 PROCEDURE — 80048 BASIC METABOLIC PNL TOTAL CA: CPT | Performed by: NURSE PRACTITIONER

## 2024-04-24 PROCEDURE — 1160F RVW MEDS BY RX/DR IN RCRD: CPT | Mod: CPTII,,, | Performed by: NURSE PRACTITIONER

## 2024-04-24 PROCEDURE — 4010F ACE/ARB THERAPY RXD/TAKEN: CPT | Mod: CPTII,,, | Performed by: NURSE PRACTITIONER

## 2024-04-24 NOTE — PROGRESS NOTES
"Patient ID: [id number]     Chief Complaint: Seizures (3mth f/u seizures)      HPI:     Obinna Burton is a 43 y.o. male in the office for Seizures (3mth f/u seizures)      On keppra 1000 mg bid. Referred to neurology 5/2023. Scheduled with Dr. Martins on 10/19/23 and no showed, has not been rescheduled to his knowledge.     No showed to his appointment with PMHNP to start treatment for alcohol abuse with the intent to subsequently start suboxone.  Rescheduled for July 3.      He's c/o waking in the night choking, feeling like he can't breath.  He doesn't  snore but reports that he can't stay awake during the day.  [place] 15.    Continues to c/o abdominal pain, "like there's a snake in there."  Still drinking almost daily (2 bottles of gin/hennesy/vodka, on the days he doesn't its because he's drank too much and spend the next day or two vomiting and diarrhea.   He states that this is when the seizures start.        Past Medical History:  has a past medical history of Allergy, Anxiety, Daily consumption of alcohol, Elevated liver enzymes, Epilepsy, unspecified, not intractable, with status epilepticus, GERD (gastroesophageal reflux disease), H/O hemophilia B, Hepatitis C virus infection resolved after antiviral drug therapy, Hypertension, Insomnia, Non-compliant behavior, and Obesity, unspecified.    Social History:  reports that he has been smoking cigarettes. He has been exposed to tobacco smoke. He has never used smokeless tobacco. He reports current alcohol use. He reports current drug use. Drug: Marijuana.    Current Outpatient Medications   Medication Instructions    amLODIPine (NORVASC) 5 mg, Oral, Daily    BENEFIX 500 unit injection Intravenous    cetirizine (ZYRTEC) 10 mg, Oral, Nightly    factor IX rec, albumin fusion (IDELVION) 3,500 (+/-) unit SolR 8,000 Units, Intravenous, Every 10 days    irbesartan (AVAPRO) 75 mg, Oral, Daily    levETIRAcetam (KEPPRA) 1,000 mg, Oral, 2 times daily    mirtazapine " "(REMERON) 7.5 mg, Oral, Daily    pantoprazole (PROTONIX) 40 mg, Oral, Daily    potassium chloride SA (K-DUR,KLOR-CON) 20 MEQ tablet 20 mEq, Oral, 2 times daily    vitamin D (VITAMIN D3) 6,000 Units, Oral, Daily       Patient is allergic to aspirin.     Patient Care Team:  Sari Cooper FNP-C as PCP - General (Family Medicine)  Laura oRsales MD as Consulting Physician (Hematology and Oncology)     Subjective     Review of Systems    See HPI     Objective     Visit Vitals  /78 (BP Location: Left arm)   Pulse 79   Temp 97.5 °F (36.4 °C) (Temporal)   Ht 5' 8" (1.727 m)   Wt 73.1 kg (161 lb 3.2 oz)   SpO2 99%   BMI 24.51 kg/m²       Physical Exam  Vitals reviewed.   Constitutional:       Appearance: Normal appearance.   Cardiovascular:      Rate and Rhythm: Normal rate and regular rhythm.      Heart sounds: Normal heart sounds.   Pulmonary:      Effort: Pulmonary effort is normal.      Breath sounds: Normal breath sounds.   Musculoskeletal:      Right knee: Deformity present. Decreased range of motion.      Left knee: Deformity present. Decreased range of motion.   Skin:     General: Skin is warm and dry.   Neurological:      Mental Status: He is alert and oriented to person, place, and time.   Psychiatric:         Mood and Affect: Mood normal.         Assessment & Plan:     1. Uncomplicated alcohol dependence  Assessment & Plan:  Stressed the importance of weaning off stressed that he needs to keep the appointment with the mental health nurse practitioner to help treat his alcoholism.  He understands that if he does not stopped drinking his condition will continue to deteriorate. He verbalizes understanding.       2. Daily consumption of alcohol    3. Seizure    4. Primary hypertension  Overview:  On amlodipine 5 mg, irbesartan 75 mg daily.       5. Hypokalemia  Assessment & Plan:  Has been taking 20 mEq of potassium daily since last ER visit on 4/7/24.  Potassium has not been rechecked since this time " 2.7    Orders:  -     Basic Metabolic Panel; Future; Expected date: 04/24/2024  -     CBC Auto Differential; Future; Expected date: 04/24/2024    6. Daytime hypersomnia  Assessment & Plan:  Suspect NOVA, referred to sleep medicine.    Orders:  -     Ambulatory referral/consult to Sleep Disorders; Future; Expected date: 05/01/2024    Follow up on GI and neurology referrals.      Follow up for 6 wk f/u. In addition to their next scheduled appointment, the patient has also been instructed to follow up on as needed basis.     Future Appointments   Date Time Provider Department Center   7/3/2024  8:00 AM [facility]

## 2024-04-24 NOTE — ASSESSMENT & PLAN NOTE
Has been taking 20 mEq of potassium daily since last ER visit on 4/7/24.  Potassium has not been rechecked since this time 2.7

## 2024-04-24 NOTE — ASSESSMENT & PLAN NOTE
Stressed the importance of weaning off stressed that he needs to keep the appointment with the mental health nurse practitioner to help treat his alcoholism.  He understands that if he does not stopped drinking his condition will continue to deteriorate. He verbalizes understanding.

## 2024-06-24 PROBLEM — K92.2 GASTROINTESTINAL HEMORRHAGE: Status: RESOLVED | Noted: 2024-03-20 | Resolved: 2024-06-24

## 2024-10-30 ENCOUNTER — TELEPHONE (OUTPATIENT)
Dept: FAMILY MEDICINE | Facility: CLINIC | Age: 44
End: 2024-10-30
Payer: MEDICAID

## 2024-10-30 DIAGNOSIS — I10 ESSENTIAL (PRIMARY) HYPERTENSION: ICD-10-CM

## 2024-10-30 RX ORDER — AMLODIPINE BESYLATE 5 MG/1
5 TABLET ORAL DAILY
Qty: 90 TABLET | Refills: 0 | Status: SHIPPED | OUTPATIENT
Start: 2024-10-30

## 2024-10-30 RX ORDER — IRBESARTAN 75 MG/1
75 TABLET ORAL DAILY
Qty: 90 TABLET | Refills: 0 | Status: SHIPPED | OUTPATIENT
Start: 2024-10-30

## 2024-11-05 ENCOUNTER — HOSPITAL ENCOUNTER (EMERGENCY)
Facility: HOSPITAL | Age: 44
Discharge: ANOTHER HOSPITAL | End: 2024-11-06
Attending: FAMILY MEDICINE
Payer: MEDICAID

## 2024-11-05 DIAGNOSIS — D66 HEMOPHILIA: ICD-10-CM

## 2024-11-05 DIAGNOSIS — R07.9 CHEST PAIN: ICD-10-CM

## 2024-11-05 DIAGNOSIS — K92.0 HEMATEMESIS, UNSPECIFIED WHETHER NAUSEA PRESENT: Primary | ICD-10-CM

## 2024-11-05 LAB
ALBUMIN SERPL-MCNC: 5.6 G/DL (ref 3.4–5)
ALBUMIN/GLOB SERPL: 1.2 RATIO
ALP SERPL-CCNC: 73 UNIT/L (ref 50–144)
ALT SERPL-CCNC: 58 UNIT/L (ref 1–45)
AMPHET UR QL SCN: NEGATIVE
ANION GAP SERPL CALC-SCNC: 26 MEQ/L (ref 2–13)
ANISOCYTOSIS BLD QL SMEAR: ABNORMAL
APTT PPP: 38.7 SECONDS (ref 23–29.4)
AST SERPL-CCNC: 139 UNIT/L (ref 17–59)
BARBITURATE SCN PRESENT UR: NEGATIVE
BASOPHILS # BLD AUTO: 0.04 X10(3)/MCL (ref 0.01–0.08)
BASOPHILS NFR BLD AUTO: 0.6 % (ref 0.1–1.2)
BENZODIAZ UR QL SCN: NEGATIVE
BILIRUB SERPL-MCNC: 1.8 MG/DL (ref 0–1)
BNP BLD-MCNC: <20 PG/ML (ref 0–124.9)
BUN SERPL-MCNC: 22 MG/DL (ref 7–20)
CALCIUM SERPL-MCNC: 9.8 MG/DL (ref 8.4–10.2)
CANNABINOIDS UR QL SCN: POSITIVE
CHLORIDE SERPL-SCNC: 89 MMOL/L (ref 98–110)
CO2 SERPL-SCNC: 21 MMOL/L (ref 21–32)
COCAINE UR QL SCN: NEGATIVE
CREAT SERPL-MCNC: 1.17 MG/DL (ref 0.66–1.25)
CREAT/UREA NIT SERPL: 19 (ref 12–20)
D DIMER PPP IA.FEU-MCNC: 1.45 MG/L (ref 0.19–0.5)
EOSINOPHIL # BLD AUTO: 0 X10(3)/MCL (ref 0.04–0.54)
EOSINOPHIL NFR BLD AUTO: 0 % (ref 0.7–7)
ERYTHROCYTE [DISTWIDTH] IN BLOOD BY AUTOMATED COUNT: 19.5 %
ETHANOL BLD-MCNC: <0.01 G/DL
ETHANOL SERPL-MCNC: <10 MG/DL
GFR SERPLBLD CREATININE-BSD FMLA CKD-EPI: 78 ML/MIN/1.73/M2
GLOBULIN SER-MCNC: 4.7 GM/DL (ref 2–3.9)
GLUCOSE SERPL-MCNC: 114 MG/DL (ref 70–115)
HCT VFR BLD AUTO: 37.7 % (ref 36–52)
HCT VFR BLD AUTO: 38.4 % (ref 36–52)
HGB BLD-MCNC: 12.9 G/DL (ref 13–18)
HGB BLD-MCNC: 13 G/DL (ref 13–18)
IMM GRANULOCYTES # BLD AUTO: 0.01 X10(3)/MCL (ref 0–0.03)
IMM GRANULOCYTES NFR BLD AUTO: 0.1 % (ref 0–0.5)
INR PPP: 1.2
LIPASE SERPL-CCNC: 59 U/L (ref 23–300)
LYMPHOCYTES # BLD AUTO: 0.77 X10(3)/MCL (ref 1.32–3.57)
LYMPHOCYTES NFR BLD AUTO: 10.6 % (ref 20–55)
MAGNESIUM SERPL-MCNC: 1.2 MG/DL (ref 1.8–2.4)
MCH RBC QN AUTO: 25.1 PG (ref 27–34)
MCHC RBC AUTO-ENTMCNC: 33.9 G/DL (ref 31–37)
MCV RBC AUTO: 74.1 FL (ref 79–99)
METHADONE UR QL SCN: NEGATIVE
MICROCYTES BLD QL SMEAR: ABNORMAL
MONOCYTES # BLD AUTO: 1.11 X10(3)/MCL (ref 0.3–0.82)
MONOCYTES NFR BLD AUTO: 15.3 % (ref 4.7–12.5)
NEUTROPHILS # BLD AUTO: 5.31 X10(3)/MCL (ref 1.78–5.38)
NEUTROPHILS NFR BLD AUTO: 73.4 % (ref 37–73)
OHS QRS DURATION: 86 MS
OHS QTC CALCULATION: 479 MS
OPIATES UR QL SCN: NEGATIVE
PCP UR QL: NEGATIVE
PH UR: 6 [PH] (ref 5–8)
PLATELET # BLD AUTO: 124 X10(3)/MCL (ref 140–371)
PLATELET # BLD EST: ABNORMAL 10*3/UL
PMV BLD AUTO: ABNORMAL FL
POIKILOCYTOSIS BLD QL SMEAR: ABNORMAL
POTASSIUM SERPL-SCNC: 2.9 MMOL/L (ref 3.5–5.1)
PROT SERPL-MCNC: 10.3 GM/DL (ref 6.3–8.2)
PROTHROMBIN TIME: 12.1 SECONDS (ref 9.3–11.9)
RBC # BLD AUTO: 5.18 X10(6)/MCL (ref 4–6)
RBC MORPH BLD: ABNORMAL
SODIUM SERPL-SCNC: 136 MMOL/L (ref 136–145)
TARGETS BLD QL SMEAR: ABNORMAL
TROPONIN I SERPL-MCNC: 0.01 NG/ML (ref 0–0.03)
TROPONIN I SERPL-MCNC: 0.01 NG/ML (ref 0–0.03)
WBC # BLD AUTO: 7.24 X10(3)/MCL (ref 4–11.5)

## 2024-11-05 PROCEDURE — 63600175 PHARM REV CODE 636 W HCPCS: Performed by: EMERGENCY MEDICINE

## 2024-11-05 PROCEDURE — 25000003 PHARM REV CODE 250: Performed by: FAMILY MEDICINE

## 2024-11-05 PROCEDURE — 83690 ASSAY OF LIPASE: CPT | Performed by: FAMILY MEDICINE

## 2024-11-05 PROCEDURE — 96366 THER/PROPH/DIAG IV INF ADDON: CPT

## 2024-11-05 PROCEDURE — 93010 ELECTROCARDIOGRAM REPORT: CPT | Mod: 59,,, | Performed by: INTERNAL MEDICINE

## 2024-11-05 PROCEDURE — 83735 ASSAY OF MAGNESIUM: CPT | Performed by: FAMILY MEDICINE

## 2024-11-05 PROCEDURE — 93005 ELECTROCARDIOGRAM TRACING: CPT

## 2024-11-05 PROCEDURE — 85018 HEMOGLOBIN: CPT | Performed by: EMERGENCY MEDICINE

## 2024-11-05 PROCEDURE — 96368 THER/DIAG CONCURRENT INF: CPT | Mod: 59

## 2024-11-05 PROCEDURE — 85730 THROMBOPLASTIN TIME PARTIAL: CPT | Performed by: FAMILY MEDICINE

## 2024-11-05 PROCEDURE — 80053 COMPREHEN METABOLIC PANEL: CPT | Performed by: FAMILY MEDICINE

## 2024-11-05 PROCEDURE — 80307 DRUG TEST PRSMV CHEM ANLYZR: CPT | Performed by: FAMILY MEDICINE

## 2024-11-05 PROCEDURE — 96365 THER/PROPH/DIAG IV INF INIT: CPT

## 2024-11-05 PROCEDURE — 25500020 PHARM REV CODE 255: Performed by: FAMILY MEDICINE

## 2024-11-05 PROCEDURE — 63600175 PHARM REV CODE 636 W HCPCS: Performed by: FAMILY MEDICINE

## 2024-11-05 PROCEDURE — 85379 FIBRIN DEGRADATION QUANT: CPT | Performed by: FAMILY MEDICINE

## 2024-11-05 PROCEDURE — 82077 ASSAY SPEC XCP UR&BREATH IA: CPT | Performed by: FAMILY MEDICINE

## 2024-11-05 PROCEDURE — 96375 TX/PRO/DX INJ NEW DRUG ADDON: CPT

## 2024-11-05 PROCEDURE — 85610 PROTHROMBIN TIME: CPT | Performed by: FAMILY MEDICINE

## 2024-11-05 PROCEDURE — 25000003 PHARM REV CODE 250: Performed by: EMERGENCY MEDICINE

## 2024-11-05 PROCEDURE — 63600531 PHARM REV CODE 636 NO ALT 250 W HCPCS: Mod: JZ,JG | Performed by: FAMILY MEDICINE

## 2024-11-05 PROCEDURE — 85025 COMPLETE CBC W/AUTO DIFF WBC: CPT | Performed by: FAMILY MEDICINE

## 2024-11-05 PROCEDURE — 99285 EMERGENCY DEPT VISIT HI MDM: CPT | Mod: 25

## 2024-11-05 PROCEDURE — 84484 ASSAY OF TROPONIN QUANT: CPT | Performed by: FAMILY MEDICINE

## 2024-11-05 PROCEDURE — 83880 ASSAY OF NATRIURETIC PEPTIDE: CPT | Performed by: FAMILY MEDICINE

## 2024-11-05 RX ORDER — HYDROMORPHONE HYDROCHLORIDE 1 MG/ML
1 INJECTION, SOLUTION INTRAMUSCULAR; INTRAVENOUS; SUBCUTANEOUS
Status: COMPLETED | OUTPATIENT
Start: 2024-11-05 | End: 2024-11-05

## 2024-11-05 RX ORDER — PANTOPRAZOLE SODIUM 40 MG/10ML
80 INJECTION, POWDER, LYOPHILIZED, FOR SOLUTION INTRAVENOUS
Status: COMPLETED | OUTPATIENT
Start: 2024-11-05 | End: 2024-11-05

## 2024-11-05 RX ORDER — MAGNESIUM SULFATE HEPTAHYDRATE 40 MG/ML
2 INJECTION, SOLUTION INTRAVENOUS
Status: COMPLETED | OUTPATIENT
Start: 2024-11-05 | End: 2024-11-05

## 2024-11-05 RX ORDER — POTASSIUM CHLORIDE 20 MEQ/1
40 TABLET, EXTENDED RELEASE ORAL
Status: COMPLETED | OUTPATIENT
Start: 2024-11-05 | End: 2024-11-05

## 2024-11-05 RX ORDER — ONDANSETRON HYDROCHLORIDE 2 MG/ML
8 INJECTION, SOLUTION INTRAVENOUS
Status: COMPLETED | OUTPATIENT
Start: 2024-11-05 | End: 2024-11-05

## 2024-11-05 RX ORDER — MORPHINE SULFATE 4 MG/ML
4 INJECTION, SOLUTION INTRAMUSCULAR; INTRAVENOUS
Status: COMPLETED | OUTPATIENT
Start: 2024-11-05 | End: 2024-11-05

## 2024-11-05 RX ORDER — LANOLIN ALCOHOL/MO/W.PET/CERES
800 CREAM (GRAM) TOPICAL ONCE
Status: COMPLETED | OUTPATIENT
Start: 2024-11-05 | End: 2024-11-05

## 2024-11-05 RX ADMIN — IOHEXOL 100 ML: 350 INJECTION, SOLUTION INTRAVENOUS at 12:11

## 2024-11-05 RX ADMIN — MORPHINE SULFATE 4 MG: 4 INJECTION, SOLUTION INTRAMUSCULAR; INTRAVENOUS at 06:11

## 2024-11-05 RX ADMIN — SODIUM CHLORIDE 8 MG/HR: 900 INJECTION INTRAVENOUS at 03:11

## 2024-11-05 RX ADMIN — PANTOPRAZOLE SODIUM 80 MG: 40 INJECTION, POWDER, LYOPHILIZED, FOR SOLUTION INTRAVENOUS at 02:11

## 2024-11-05 RX ADMIN — HYDROMORPHONE HYDROCHLORIDE 1 MG: 1 INJECTION, SOLUTION INTRAMUSCULAR; INTRAVENOUS; SUBCUTANEOUS at 02:11

## 2024-11-05 RX ADMIN — Medication 800 MG: at 02:11

## 2024-11-05 RX ADMIN — ONDANSETRON 8 MG: 2 INJECTION INTRAMUSCULAR; INTRAVENOUS at 11:11

## 2024-11-05 RX ADMIN — MAGNESIUM SULFATE HEPTAHYDRATE 2 G: 40 INJECTION, SOLUTION INTRAVENOUS at 07:11

## 2024-11-05 RX ADMIN — PROMETHAZINE HYDROCHLORIDE 12.5 MG: 25 INJECTION INTRAMUSCULAR; INTRAVENOUS at 08:11

## 2024-11-05 RX ADMIN — SODIUM CHLORIDE 8 MG/HR: 900 INJECTION INTRAVENOUS at 08:11

## 2024-11-05 RX ADMIN — POTASSIUM CHLORIDE 40 MEQ: 1500 TABLET, EXTENDED RELEASE ORAL at 02:11

## 2024-11-05 RX ADMIN — COAGULATION FACTOR IX RECOMBINANT HUMAN 8000 UNITS: KIT at 04:11

## 2024-11-05 NOTE — ED PROVIDER NOTES
History     Chief Complaint   Patient presents with    Chest Pain     PT C/O midsternal chest pain onset last night, 11/4/24. Currently rates it at a 10. Experiencing nausea and vomiting.     Patient presents with a chief complaint of chest pain since last night.  He also states nausea, vomiting blood.  He is a hemophiliac.  He states he has been a consistent heavy drinker but quit drinking proximally 2 weeks ago.        Review of patient's allergies indicates:   Allergen Reactions    Aspirin Other (See Comments)     Thins blood  Other reaction(s): exacerbates bleeding  ASA may exacerbate bleeding       Past Medical History:   Diagnosis Date    Allergy     seasonal    Anxiety     Daily consumption of alcohol     Elevated liver enzymes     Epilepsy, unspecified, not intractable, with status epilepticus     GERD (gastroesophageal reflux disease)     H/O hemophilia B     Hepatitis C virus infection resolved after antiviral drug therapy     Hypertension     Insomnia     Non-compliant behavior     Obesity, unspecified      Past Surgical History:   Procedure Laterality Date    ABDOMINAL SURGERY  2008    bilateral knee scope  1984    ESOPHAGOGASTRODUODENOSCOPY  10/22/2021     Family History   Problem Relation Name Age of Onset    Hypertension Mother      Hypertension Father       Social History     Tobacco Use    Smoking status: Every Day     Current packs/day: 1.00     Types: Cigarettes     Passive exposure: Current    Smokeless tobacco: Never   Substance Use Topics    Alcohol use: Yes     Comment: beer daily    Drug use: Yes     Types: Marijuana     Comment: daily     Review of Systems   Constitutional: Negative.    HENT: Negative.     Respiratory: Negative.     Cardiovascular:  Positive for chest pain.   Gastrointestinal:  Positive for nausea and vomiting.        Hematemesis       Physical Exam     Initial Vitals [11/05/24 1107]   BP Pulse Resp Temp SpO2   (!) 130/92 (!) 118 20 98.8 °F (37.1 °C) 98 %      MAP        --         Physical Exam    Constitutional: He appears well-developed and well-nourished.   Cardiovascular:  Normal rate, regular rhythm and normal heart sounds.           Pulmonary/Chest: Breath sounds normal.   Abdominal: Abdomen is soft. Bowel sounds are normal.     Neurological: He is alert and oriented to person, place, and time.   Skin: Skin is warm and dry.         ED Course   Critical Care    Date/Time: 11/5/2024 10:59 AM    Performed by: Jefferson Mccormack MD  Authorized by: Jefferson Mccormack MD  Direct patient critical care time: 14 minutes  Ordering / reviewing critical care time: 12 minutes  Documentation critical care time: 11 minutes  Consulting other physicians critical care time: 6 minutes  Total critical care time (exclusive of procedural time) : 43 minutes  Critical care was necessary to treat or prevent imminent or life-threatening deterioration of the following conditions: circulatory failure.  Critical care was time spent personally by me on the following activities: development of treatment plan with patient or surrogate, discussions with consultants, interpretation of cardiac output measurements, evaluation of patient's response to treatment, examination of patient, obtaining history from patient or surrogate, ordering and review of laboratory studies, ordering and review of radiographic studies, pulse oximetry, re-evaluation of patient's condition and review of old charts.        Labs Reviewed   COMPREHENSIVE METABOLIC PANEL - Abnormal       Result Value    Sodium 136      Potassium 2.9 (*)     Chloride 89 (*)     CO2 21      Glucose 114      Blood Urea Nitrogen 22 (*)     Creatinine 1.17      Calcium 9.8      Protein Total 10.3 (*)     Albumin 5.6 (*)     Globulin 4.7 (*)     Albumin/Globulin Ratio 1.2      Bilirubin Total 1.8 (*)     ALP 73      ALT 58 (*)      (*)     eGFR 78      Anion Gap 26.0 (*)     BUN/Creatinine Ratio 19     DRUG SCREEN, URINE (BEAKER) - Abnormal     Amphetamines, Urine Negative      Barbiturates, Urine Negative      Benzodiazepine, Urine Negative      Cannabinoids, Urine Positive (*)     Cocaine, Urine Negative      Opiates, Urine Negative      Phencyclidine, Urine Negative      Methadone, Urine Negative      pH, Urine 6.0      Narrative:     Cut off concentrations:    Amphetamines - 1000 ng/ml  Barbiturates - 200 ng/ml  Benzodiazepine - 200 ng/ml  Cannabinoids (THC) - 50 ng/ml  Cocaine - 300 ng/ml  Fentanyl - 1.0 ng/ml  MDMA - 500 ng/ml  Opiates - 300 ng/ml   Phencyclidine (PCP) - 25 ng/ml  Methadone - 300 ng/ml      False negatives may result form substances such as bleach added to urine.  False positives may result for the presence of a substance with similar chemical structure to the drug or its metabolite.    This test provides only a PRELIMINARY analytical test result. A more specific alternate chemical method must be used in order to obtain a confirmed analytical result. Gas chromatography/mass spectrometry (GC/MS) is the preferred confirmatory method. Other chemical confirmation methods are available. Clinical consideration and professional judgement should be applied to any drug of abuse test result, particularly when preliminary positive results are used.    Positive results will be confirmed only at the physicians request. Unconfirmed screening results are to be used only for medical purposes (treatment).          MAGNESIUM - Abnormal    Magnesium Level 1.20 (*)    D DIMER, QUANTITATIVE - Abnormal    D-Dimer 1.45 (*)    CBC WITH DIFFERENTIAL - Abnormal    WBC 7.24      RBC 5.18      Hgb 13.0      Hct 38.4      MCV 74.1 (*)     MCH 25.1 (*)     MCHC 33.9      RDW 19.5      Platelet 124 (*)     MPV        Neut % 73.4 (*)     Lymph % 10.6 (*)     Mono % 15.3 (*)     Eos % 0.0 (*)     Basophil % 0.6      Lymph # 0.77 (*)     Neut # 5.31      Mono # 1.11 (*)     Eos # 0.00 (*)     Baso # 0.04      IG# 0.01      IG% 0.1     PROTIME-INR - Abnormal    PT  12.1 (*)     INR 1.2      Narrative:     Protimes are used to monitor anticoagulant agents such as warfarin. PT INR values are based on the current patient normal mean and the ARAVIND value for the specific instrument reagent used.  **Routine theraputic target values for the INR are 2.0-3.0**   APTT - Abnormal    PTT 38.7 (*)    BLOOD SMEAR MICROSCOPIC EXAM (OLG) - Abnormal    RBC Morph Abnormal (*)     Anisocytosis 1+ (*)     Microcytosis 1+ (*)     Poikilocytosis 1+ (*)     Target Cells 1+ (*)     Platelets Decreased (*)    HEMOGLOBIN AND HEMATOCRIT, BLOOD - Abnormal    Hgb 12.9 (*)     Hct 37.7     HEMOGLOBIN AND HEMATOCRIT, BLOOD - Abnormal    Hgb 11.3 (*)     Hct 32.6 (*)    TROPONIN I - Normal    Troponin-I 0.014     NT-PRO NATRIURETIC PEPTIDE - Normal    ProBNP <20.0     LIPASE - Normal    Lipase Level 59     ALCOHOL,MEDICAL (ETHANOL) - Normal    Ethanol Level <10.0      Alcohol, Legal Level <0.010     TROPONIN I - Normal    Troponin-I 0.014     FACTOR 9 ASSAY - Normal    Factor      CBC W/ AUTO DIFFERENTIAL    Narrative:     The following orders were created for panel order CBC auto differential.  Procedure                               Abnormality         Status                     ---------                               -----------         ------                     CBC with Differential[[id number]]       Abnormal            Final result                 Please view results for these tests on the individual orders.     EKG Readings: (Independently Interpreted)   Initial Reading: No STEMI. Rhythm: Normal Sinus Rhythm. ST Segment Depression: II, III, AVF, V4, V5, V6 and V3. T Waves: Normal.   Other EKG Interpretations: Nsr, rate 93, nl qrs, non-0specific st changes, nl t waves     ECG Results              EKG 12-lead (Final result)        Collection Time Result Time QRS Duration [facility]HS QTC Calculation    11/05/24 16:06:46 11/06/24 12:00:18 92 474                     Final result by Interface, Lab In [facility]  (11/06/24 12:00:22)                   Narrative:    Test Reason : R07.9,    Vent. Rate : 093 BPM     Atrial Rate : 093 BPM     P-R Int : 136 ms          QRS Dur : 092 ms      QT Int : 382 ms       P-R-T Axes : 067 089 016 degrees     QTc Int : 474 ms    Normal sinus rhythm  Nonspecific ST abnormality  Abnormal ECG  When compared with ECG of 05-NOV-2024 11:10,  No significant change was found  Confirmed by Herbert Ellis MD ([id number]) on 11/6/2024 12:00:16 PM    Referred By: [email or web address]   SELF           Confirmed By:Herbert Ellis MD                                     EKG 12-lead (Final result)        Collection Time Result Time QRS Duration OHS QTC Calculation    11/05/24 11:10:41 11/05/24 17:08:54 86 479                     Final result by Interface, Lab In [facility] (11/05/24 17:09:03)                   Narrative:    Test Reason : R07.9,    Vent. Rate : 118 BPM     Atrial Rate : 118 BPM     P-R Int : 126 ms          QRS Dur : 086 ms      QT Int : 342 ms       P-R-T Axes : 074 095 007 degrees     QTc Int : 479 ms    Sinus tachycardia  Rightward axis  ST and T wave abnormality, consider inferior ischemia  Abnormal ECG  No previous ECGs available  Confirmed by Herbert Ellis MD ([id number]) on 11/5/2024 5:08:52 PM    Referred By: BERENICE   SELF           Confirmed By:Herbert Ellis MD                                  Imaging Results              CT Abdomen Pelvis With IV Contrast NO Oral Contrast (Final result)  Result time 11/05/24 12:25:22      Final result by Carlyle Olmstead III, MD (11/05/24 12:25:22)                   Impression:      1. Soft tissue fullness is noted within the distal esophagus with fluid noted proximally.  These may simply represent changes related achalasia, however, further workup with endoscopic evaluation may be helpful in excluding and esophageal carcinoma.  2. Diffuse fatty infiltration of the liver.  3. 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      Electronically signed by: Carlyle  Ishan  Date:    11/05/2024  Time:    12:25               Narrative:    EXAMINATION:  CT ABDOMEN PELVIS WITH IV CONTRAST    CLINICAL HISTORY AND TECHNIQUE:  Hemoptysis    This patient has had 0 CT and nuclear medicine scans performed within the last 12 months.    The following DOSE REDUCTION TECHNIQUES are used for all CT scans at [facility]:    1. Automated exposure control.  2. Adjustment of the mA and/or kv according to patient size.  3. Use of iterative reconstruction technique.    COMPARISON:  None    FINDINGS:  Liver: Prominent, diffuse fatty infiltration of the liver is present with no additional hepatic abnormalities noted.    Gallbladder/biliary system: No clinically significant abnormalities are noted.    Spleen: No clinically significant abnormalities are noted.    Adrenal glands: No clinically significant abnormalities are noted.    Pancreas: No clinically significant abnormalities are noted.    Kidneys/ureters: There is ptosis of the right kidney which is displaced inferiorly into the lower abdomen but appears otherwise unremarkable.  I see no additional parenchymal abnormalities and there is no evidence of ureteral stones or changes to suggest ureteral obstruction.    Urinary bladder: No clinically significant abnormalities are noted.  The bladder is poorly distended and difficult to evaluate.    Prostate gland and seminal vesicles: No clinically significant abnormalities are noted.    GI tract: There is soft tissue fullness within the distal esophagus with mild dilatation and fluid noted proximally.  Unopacified loops of large and small bowel as well as the gastric lumen and appendix are difficult to evaluate with no definite abnormalities appreciated.    Vascular structures: No clinically significant abnormalities are noted.    Musculoskeletal structures: No clinically significant abnormalities are noted.    Miscellaneous: N/A                                       CTA Chest  Non-Coronary (PE Studies) (Final result)  Result time 11/05/24 12:21:44      Final result by Carlyle Olmstead III, MD (11/05/24 12:21:44)                   Impression:      1. There is no evidence of pulmonary embolus or other significant abnormalities involving the pulmonary arterial vasculature to the level of the segmental arterial branches.  2. Incidentally noted is extensive, diffuse fatty infiltration of the liver.  3. There is mild to moderate dilatation of the esophagus which contains fluid.      Electronically signed by: Carlyle Olmstead  Date:    11/05/2024  Time:    12:21               Narrative:    EXAMINATION:  STUDY:CTA CHEST NON CORONARY (PE STUDIES)    CLINICAL HISTORY AND TECHNIQUE:  Chest pain    This patient has had 0 CT and nuclear medicine scans performed within the last 12 months.    The following DOSE REDUCTION TECHNIQUES are used for all CT scans at [facility]:    1. Automated exposure control.  2. Adjustment of the mA and/or kv according to patient size.  3. Use of iterative reconstruction technique.    COMPARISON:  None    FINDINGS:  CT angiography of the pulmonary arterial vasculature with intravenous contrast was performed with post processing and multiplanar and 3 dimensional reconstruction of images. The quality of the study is good. The pulmonary arterial vasculature appears unremarkable without evidence of significant thrombus/pulmonary embolus, stenotic segments, aneurysmal dilatation, vascular malformations or other significant abnormalities to the level of the segmental arterial branches. There is diffuse fatty infiltration of the liver.  There is mild to moderate dilatation of the esophagus which contains fluid.                                       X-Ray Chest AP Portable (Final result)  Result time 11/05/24 12:27:09      Final result by Carlyle Olmstead III, MD (11/05/24 12:27:09)                   Impression:      1. I see no lobar or segmental infiltrates or other  significant abnormalities.      Electronically signed by: Lindapatt Ishan  Date:    11/05/2024  Time:    12:27               Narrative:    EXAMINATION:  STUDY: XR CHEST AP PORTABLE    CLINICAL HISTORY AND TECHNIQUE:  Chest pain    COMPARISON:  03/15/2024    FINDINGS:  The cardiac, hilar, and mediastinal contours appear unremarkable.I see no lobar or segmental infiltrates.No significant pleural effusions are noted.No significant musculoskeletal or vascular abnormalities are appreciated.                                       Medications   ondansetron injection 8 mg (8 mg Intravenous Given 11/5/24 1146)   iohexoL (OMNIPAQUE 350) injection 100 mL (100 mLs Intravenous Given 11/5/24 1211)   potassium chloride SA CR tablet 40 mEq (40 mEq Oral Given 11/5/24 1410)   magnesium oxide tablet 800 mg (800 mg Oral Given 11/5/24 1410)   pantoprazole injection 80 mg (80 mg Intravenous Given 11/5/24 1415)   HYDROmorphone injection 1 mg (1 mg Intravenous Given 11/5/24 1416)   factor IX rec, albumin fusion 3,500 (+/-) unit SolR 8,000 Units (8,000 Units Intravenous Given 11/5/24 1635)   morphine injection 4 mg (4 mg Intravenous Given 11/5/24 1826)   magnesium sulfate 2g in water 50mL IVPB (premix) (0 g Intravenous Stopped 11/5/24 2148)   promethazine (PHENERGAN) 12.5 mg in 0.9% NaCl 50 mL IVPB (0 mg Intravenous Stopped 11/5/24 2035)   morphine injection 4 mg (4 mg Intravenous Given 11/6/24 0229)   ondansetron injection 4 mg (4 mg Intravenous Given 11/6/24 0229)   ondansetron injection 4 mg (4 mg Intravenous Given 11/6/24 0735)   morphine injection 4 mg (4 mg Intravenous Given 11/6/24 0735)   lactated ringers infusion (1,000 mLs Intravenous New Bag 11/6/24 1019)   potassium chloride 10 mEq in 100 mL IVPB (10 mEq Intravenous New Bag 11/6/24 1252)     Medical Decision Making  Patient having hematemesis in the ER    Amount and/or Complexity of Data Reviewed  Labs: ordered.  Radiology: ordered.  Discussion of management or test interpretation  with external provider(s): Case discussed with surgery on-call.  In light of the patient's alcohol use, hemophilia, upper GI bleed, it was felt he would benefit from GI services.    Risk  OTC drugs.  Prescription drug management.      Additional MDM:   Differential Diagnosis:   Esophageal mass, esophageal varices, upper GI bleed, STEMI, non STEMI, pulmonary embolism, erosive esophagitis, peptic ulcer disease             ED Course as of 11/14/24 0516 Wed Nov 06, 2024   1037 Patient states that he did vomit blood at shift change this morning x1.  It is maybe 100 cc -150 cc.  Patient's last dose of factors at 4:30 p.m. yesterday.  Patient is in no distress.  We are awaiting transfer to [facility] as [facility] does not have factor 9.  Patient's hematologist has privileges at [facility] and she was requesting transfer to [facility]. [JR]   1234 Patient has been accepted as a transfer to [facility] by Dr. Ortiz. [JR]      ED Course User Index  [JR] Nestor Francois DO                           Clinical Impression:  Final diagnoses:  [R07.9] Chest pain  [K92.0] Hematemesis, unspecified whether nausea present (Primary)  [D66] Hemophilia          ED Disposition Condition    Transfer to Another Facility Stable                Jefferson Mccormack MD  11/05/24 1652       Jefferson Mccormack MD  11/14/24 0516     Cyclophosphamide Pregnancy And Lactation Text: This medication is Pregnancy Category D and it isn't considered safe during pregnancy. This medication is excreted in breast milk.

## 2024-11-05 NOTE — ED NOTES
PT HAS ONE HOME MEDICATION PRESENT IN ED, IDELVION REPORTS HE TAKES 8000 UNITS EVERY 10 DAYS UNLESS HE HAS BLEEDING. PT. REPORTS HE LAST TOOK THIS MEDICATION 10 DAYS AGO, WAS SUPPOSED TO TAKE IT AGAIN TODAY.

## 2024-11-06 VITALS
DIASTOLIC BLOOD PRESSURE: 72 MMHG | TEMPERATURE: 98 F | HEIGHT: 66 IN | HEART RATE: 98 BPM | SYSTOLIC BLOOD PRESSURE: 136 MMHG | RESPIRATION RATE: 18 BRPM | WEIGHT: 161 LBS | OXYGEN SATURATION: 98 % | BODY MASS INDEX: 25.88 KG/M2

## 2024-11-06 LAB
FACT IX ACT/NOR PPP: 121 % (ref 59–191)
HCT VFR BLD AUTO: 32.6 % (ref 36–52)
HGB BLD-MCNC: 11.3 G/DL (ref 13–18)
OHS QRS DURATION: 92 MS
OHS QTC CALCULATION: 474 MS

## 2024-11-06 PROCEDURE — 85014 HEMATOCRIT: CPT | Performed by: EMERGENCY MEDICINE

## 2024-11-06 PROCEDURE — 63600175 PHARM REV CODE 636 W HCPCS: Performed by: FAMILY MEDICINE

## 2024-11-06 PROCEDURE — 96376 TX/PRO/DX INJ SAME DRUG ADON: CPT

## 2024-11-06 PROCEDURE — 85250 CLOT FACTOR IX PTC/CHRSTMAS: CPT | Performed by: EMERGENCY MEDICINE

## 2024-11-06 PROCEDURE — 63600175 PHARM REV CODE 636 W HCPCS: Performed by: EMERGENCY MEDICINE

## 2024-11-06 PROCEDURE — 96375 TX/PRO/DX INJ NEW DRUG ADDON: CPT

## 2024-11-06 PROCEDURE — 96366 THER/PROPH/DIAG IV INF ADDON: CPT

## 2024-11-06 PROCEDURE — 25000003 PHARM REV CODE 250: Performed by: FAMILY MEDICINE

## 2024-11-06 RX ORDER — ONDANSETRON HYDROCHLORIDE 2 MG/ML
4 INJECTION, SOLUTION INTRAVENOUS
Status: COMPLETED | OUTPATIENT
Start: 2024-11-06 | End: 2024-11-06

## 2024-11-06 RX ORDER — SODIUM CHLORIDE, SODIUM LACTATE, POTASSIUM CHLORIDE, CALCIUM CHLORIDE 600; 310; 30; 20 MG/100ML; MG/100ML; MG/100ML; MG/100ML
1000 INJECTION, SOLUTION INTRAVENOUS
Status: COMPLETED | OUTPATIENT
Start: 2024-11-06 | End: 2024-11-06

## 2024-11-06 RX ORDER — MORPHINE SULFATE 4 MG/ML
4 INJECTION, SOLUTION INTRAMUSCULAR; INTRAVENOUS
Status: COMPLETED | OUTPATIENT
Start: 2024-11-06 | End: 2024-11-06

## 2024-11-06 RX ORDER — MORPHINE SULFATE 4 MG/ML
4 INJECTION, SOLUTION INTRAMUSCULAR; INTRAVENOUS ONCE
Status: COMPLETED | OUTPATIENT
Start: 2024-11-06 | End: 2024-11-06

## 2024-11-06 RX ORDER — PANTOPRAZOLE SODIUM 40 MG/10ML
40 INJECTION, POWDER, LYOPHILIZED, FOR SOLUTION INTRAVENOUS
Status: DISCONTINUED | OUTPATIENT
Start: 2024-11-06 | End: 2024-11-06

## 2024-11-06 RX ORDER — POTASSIUM CHLORIDE 7.45 MG/ML
10 INJECTION INTRAVENOUS
Status: COMPLETED | OUTPATIENT
Start: 2024-11-06 | End: 2024-11-06

## 2024-11-06 RX ADMIN — SODIUM CHLORIDE 8 MG/HR: 900 INJECTION INTRAVENOUS at 01:11

## 2024-11-06 RX ADMIN — SODIUM CHLORIDE, POTASSIUM CHLORIDE, SODIUM LACTATE AND CALCIUM CHLORIDE 1000 ML: 600; 310; 30; 20 INJECTION, SOLUTION INTRAVENOUS at 10:11

## 2024-11-06 RX ADMIN — MORPHINE SULFATE 4 MG: 4 INJECTION, SOLUTION INTRAMUSCULAR; INTRAVENOUS at 02:11

## 2024-11-06 RX ADMIN — SODIUM CHLORIDE 8 MG/HR: 900 INJECTION INTRAVENOUS at 11:11

## 2024-11-06 RX ADMIN — ONDANSETRON 4 MG: 2 INJECTION INTRAMUSCULAR; INTRAVENOUS at 02:11

## 2024-11-06 RX ADMIN — MORPHINE SULFATE 4 MG: 4 INJECTION, SOLUTION INTRAMUSCULAR; INTRAVENOUS at 07:11

## 2024-11-06 RX ADMIN — POTASSIUM CHLORIDE 10 MEQ: 7.46 INJECTION, SOLUTION INTRAVENOUS at 12:11

## 2024-11-06 RX ADMIN — ONDANSETRON 4 MG: 2 INJECTION INTRAMUSCULAR; INTRAVENOUS at 07:11

## 2024-11-06 RX ADMIN — SODIUM CHLORIDE 8 MG/HR: 900 INJECTION INTRAVENOUS at 06:11

## 2024-11-06 NOTE — PROVIDER PROGRESS NOTES - EMERGENCY DEPT.
Encounter Date: 11/5/2024    ED Physician Progress Notes            Care handed over to myself.  I discussed case with receiving facility [facility].  Patient accepted at [facility] by Dr. Geraldo Perez.  Patient will be transferred via [facility].  Reason for transfer is we do not have Gastroenterology services at this facility.  Patient voiced understanding of the plan of care including the need for transfer.  Patient did have hematemesis here.  Repeat hemoglobin ordered and stable.  Protonix and factor replacement given. Awaiting Transfer at this time

## 2024-11-15 ENCOUNTER — TELEPHONE (OUTPATIENT)
Dept: FAMILY MEDICINE | Facility: CLINIC | Age: 44
End: 2024-11-15
Payer: MEDICAID

## 2024-11-15 DIAGNOSIS — K21.9 GASTROESOPHAGEAL REFLUX DISEASE, UNSPECIFIED WHETHER ESOPHAGITIS PRESENT: Primary | ICD-10-CM

## 2024-11-15 RX ORDER — OMEPRAZOLE 20 MG/1
20 CAPSULE, DELAYED RELEASE ORAL 2 TIMES DAILY
Qty: 180 CAPSULE | Refills: 3 | Status: SHIPPED | OUTPATIENT
Start: 2024-11-15 | End: 2025-11-15

## 2024-11-22 ENCOUNTER — TELEPHONE (OUTPATIENT)
Dept: FAMILY MEDICINE | Facility: CLINIC | Age: 44
End: 2024-11-22
Payer: MEDICAID
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
## 2024-11-25 ENCOUNTER — OFFICE VISIT (OUTPATIENT)
Dept: FAMILY MEDICINE | Facility: CLINIC | Age: 44
End: 2024-11-25
Payer: MEDICAID

## 2024-11-25 VITALS
DIASTOLIC BLOOD PRESSURE: 72 MMHG | WEIGHT: 176.81 LBS | TEMPERATURE: 97 F | HEART RATE: 90 BPM | BODY MASS INDEX: 28.42 KG/M2 | OXYGEN SATURATION: 98 % | SYSTOLIC BLOOD PRESSURE: 110 MMHG | HEIGHT: 66 IN

## 2024-11-25 DIAGNOSIS — G40.909 SEIZURE DISORDER: ICD-10-CM

## 2024-11-25 DIAGNOSIS — F10.20 UNCOMPLICATED ALCOHOL DEPENDENCE: ICD-10-CM

## 2024-11-25 DIAGNOSIS — K92.0 HEMATEMESIS, UNSPECIFIED WHETHER NAUSEA PRESENT: Primary | ICD-10-CM

## 2024-11-25 DIAGNOSIS — D67 HEMOPHILIA B: ICD-10-CM

## 2024-11-25 DIAGNOSIS — I10 PRIMARY HYPERTENSION: ICD-10-CM

## 2024-11-25 DIAGNOSIS — R56.9 SEIZURE: ICD-10-CM

## 2024-11-25 DIAGNOSIS — I10 ESSENTIAL (PRIMARY) HYPERTENSION: ICD-10-CM

## 2024-11-25 DIAGNOSIS — K20.90 ESOPHAGITIS: ICD-10-CM

## 2024-11-25 PROCEDURE — 1159F MED LIST DOCD IN RCRD: CPT | Mod: CPTII,,, | Performed by: NURSE PRACTITIONER

## 2024-11-25 PROCEDURE — 3074F SYST BP LT 130 MM HG: CPT | Mod: CPTII,,, | Performed by: NURSE PRACTITIONER

## 2024-11-25 PROCEDURE — 4010F ACE/ARB THERAPY RXD/TAKEN: CPT | Mod: CPTII,,, | Performed by: NURSE PRACTITIONER

## 2024-11-25 PROCEDURE — 3008F BODY MASS INDEX DOCD: CPT | Mod: CPTII,,, | Performed by: NURSE PRACTITIONER

## 2024-11-25 PROCEDURE — 1160F RVW MEDS BY RX/DR IN RCRD: CPT | Mod: CPTII,,, | Performed by: NURSE PRACTITIONER

## 2024-11-25 PROCEDURE — 3044F HG A1C LEVEL LT 7.0%: CPT | Mod: CPTII,,, | Performed by: NURSE PRACTITIONER

## 2024-11-25 PROCEDURE — 99214 OFFICE O/P EST MOD 30 MIN: CPT | Mod: ,,, | Performed by: NURSE PRACTITIONER

## 2024-11-25 PROCEDURE — 3078F DIAST BP <80 MM HG: CPT | Mod: CPTII,,, | Performed by: NURSE PRACTITIONER

## 2024-11-25 RX ORDER — SIMETHICONE 80 MG
80 TABLET,CHEWABLE ORAL 2 TIMES DAILY PRN
COMMUNITY
Start: 2024-11-16 | End: 2025-02-14

## 2024-11-25 RX ORDER — LEVETIRACETAM 500 MG/1
1000 TABLET ORAL 2 TIMES DAILY
Qty: 360 TABLET | Refills: 3 | Status: SHIPPED | OUTPATIENT
Start: 2024-11-25

## 2024-11-25 RX ORDER — LOSARTAN POTASSIUM 50 MG/1
1 TABLET ORAL 2 TIMES DAILY
COMMUNITY
Start: 2024-11-16 | End: 2024-11-25

## 2024-11-25 RX ORDER — DIPHENHYDRAMINE HCL 25 MG
25 TABLET ORAL NIGHTLY PRN
COMMUNITY

## 2024-11-25 RX ORDER — MULTIVITAMIN
1 TABLET ORAL DAILY
Qty: 90 TABLET | Refills: 3 | Status: SHIPPED | OUTPATIENT
Start: 2024-11-25

## 2024-11-25 RX ORDER — IRBESARTAN 150 MG/1
150 TABLET ORAL DAILY
Qty: 90 TABLET | Refills: 3 | Status: SHIPPED | OUTPATIENT
Start: 2024-11-25

## 2024-11-25 RX ORDER — AMLODIPINE BESYLATE 5 MG/1
5 TABLET ORAL DAILY
Qty: 90 TABLET | Refills: 3 | Status: SHIPPED | OUTPATIENT
Start: 2024-11-25

## 2024-11-25 RX ORDER — IRBESARTAN 150 MG/1
150 TABLET ORAL DAILY
COMMUNITY
End: 2024-11-25 | Stop reason: SDUPTHER

## 2024-11-25 RX ORDER — MULTIVITAMIN
TABLET ORAL
COMMUNITY
End: 2024-11-25 | Stop reason: SDUPTHER
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
## 2024-11-25 NOTE — ASSESSMENT & PLAN NOTE
Referring to GI for continued treatment. He reports having a blood clot stuck where his esophagus meets his stomach.    Continue PPI BID  Advised to stop drinking in an inpatient facility.  He declines.

## 2025-02-06 ENCOUNTER — TELEPHONE (OUTPATIENT)
Dept: FAMILY MEDICINE | Facility: CLINIC | Age: 45
End: 2025-02-06

## 2025-02-07 ENCOUNTER — TELEPHONE (OUTPATIENT)
Dept: FAMILY MEDICINE | Facility: CLINIC | Age: 45
End: 2025-02-07
Payer: MEDICAID
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
## 2025-02-11 ENCOUNTER — OFFICE VISIT (OUTPATIENT)
Dept: FAMILY MEDICINE | Facility: CLINIC | Age: 45
End: 2025-02-11
Payer: MEDICAID

## 2025-02-11 VITALS
TEMPERATURE: 97 F | HEIGHT: 66 IN | SYSTOLIC BLOOD PRESSURE: 116 MMHG | OXYGEN SATURATION: 98 % | HEART RATE: 92 BPM | BODY MASS INDEX: 28.28 KG/M2 | DIASTOLIC BLOOD PRESSURE: 80 MMHG | WEIGHT: 176 LBS

## 2025-02-11 DIAGNOSIS — M25.562 CHRONIC PAIN OF BOTH KNEES: ICD-10-CM

## 2025-02-11 DIAGNOSIS — G89.29 CHRONIC PAIN OF BOTH KNEES: ICD-10-CM

## 2025-02-11 DIAGNOSIS — F10.20 UNCOMPLICATED ALCOHOL DEPENDENCE: ICD-10-CM

## 2025-02-11 DIAGNOSIS — D67 HEMOPHILIA B: ICD-10-CM

## 2025-02-11 DIAGNOSIS — M25.561 CHRONIC PAIN OF BOTH KNEES: ICD-10-CM

## 2025-02-11 DIAGNOSIS — G40.909 SEIZURE DISORDER: Primary | ICD-10-CM

## 2025-02-11 PROCEDURE — 1160F RVW MEDS BY RX/DR IN RCRD: CPT | Mod: CPTII,,, | Performed by: NURSE PRACTITIONER

## 2025-02-11 PROCEDURE — 99214 OFFICE O/P EST MOD 30 MIN: CPT | Mod: ,,, | Performed by: NURSE PRACTITIONER

## 2025-02-11 PROCEDURE — 1159F MED LIST DOCD IN RCRD: CPT | Mod: CPTII,,, | Performed by: NURSE PRACTITIONER

## 2025-02-11 PROCEDURE — 3074F SYST BP LT 130 MM HG: CPT | Mod: CPTII,,, | Performed by: NURSE PRACTITIONER

## 2025-02-11 PROCEDURE — 3079F DIAST BP 80-89 MM HG: CPT | Mod: CPTII,,, | Performed by: NURSE PRACTITIONER

## 2025-02-11 PROCEDURE — 3008F BODY MASS INDEX DOCD: CPT | Mod: CPTII,,, | Performed by: NURSE PRACTITIONER

## 2025-02-11 PROCEDURE — 3044F HG A1C LEVEL LT 7.0%: CPT | Mod: CPTII,,, | Performed by: NURSE PRACTITIONER

## 2025-02-11 PROCEDURE — G2211 COMPLEX E/M VISIT ADD ON: HCPCS | Mod: ,,, | Performed by: NURSE PRACTITIONER

## 2025-02-11 RX ORDER — LEVETIRACETAM 500 MG/1
1000 TABLET ORAL 2 TIMES DAILY
Qty: 360 TABLET | Refills: 3 | Status: SHIPPED | OUTPATIENT
Start: 2025-02-11
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
## 2025-02-20 ENCOUNTER — PATIENT MESSAGE (OUTPATIENT)
Dept: FAMILY MEDICINE | Facility: CLINIC | Age: 45
End: 2025-02-20
Payer: MEDICAID

## 2025-02-21 ENCOUNTER — RESULTS FOLLOW-UP (OUTPATIENT)
Dept: FAMILY MEDICINE | Facility: CLINIC | Age: 45
End: 2025-02-21

## 2025-02-21 PROCEDURE — 83036 HEMOGLOBIN GLYCOSYLATED A1C: CPT | Performed by: NURSE PRACTITIONER

## 2025-02-21 PROCEDURE — 84443 ASSAY THYROID STIM HORMONE: CPT | Performed by: NURSE PRACTITIONER

## 2025-02-21 PROCEDURE — 80053 COMPREHEN METABOLIC PANEL: CPT | Performed by: NURSE PRACTITIONER

## 2025-02-21 PROCEDURE — 85025 COMPLETE CBC W/AUTO DIFF WBC: CPT | Performed by: NURSE PRACTITIONER

## 2025-02-21 PROCEDURE — 80061 LIPID PANEL: CPT | Performed by: NURSE PRACTITIONER

## 2025-07-08 ENCOUNTER — TELEPHONE (OUTPATIENT)
Dept: FAMILY MEDICINE | Facility: CLINIC | Age: 45
End: 2025-07-08

## 2025-07-10 ENCOUNTER — OFFICE VISIT (OUTPATIENT)
Dept: FAMILY MEDICINE | Facility: CLINIC | Age: 45
End: 2025-07-10
Payer: MEDICAID

## 2025-07-10 VITALS
DIASTOLIC BLOOD PRESSURE: 98 MMHG | WEIGHT: 167.63 LBS | BODY MASS INDEX: 26.94 KG/M2 | OXYGEN SATURATION: 98 % | TEMPERATURE: 98 F | HEART RATE: 99 BPM | SYSTOLIC BLOOD PRESSURE: 140 MMHG | HEIGHT: 66 IN

## 2025-07-10 DIAGNOSIS — K59.00 CONSTIPATION, UNSPECIFIED CONSTIPATION TYPE: ICD-10-CM

## 2025-07-10 DIAGNOSIS — G40.909 SEIZURE DISORDER: ICD-10-CM

## 2025-07-10 DIAGNOSIS — I10 PRIMARY HYPERTENSION: ICD-10-CM

## 2025-07-10 DIAGNOSIS — R20.2 PARESTHESIA: Primary | ICD-10-CM

## 2025-07-10 DIAGNOSIS — D67 HEMOPHILIA B: ICD-10-CM

## 2025-07-10 DIAGNOSIS — K20.90 ESOPHAGITIS: ICD-10-CM

## 2025-07-10 DIAGNOSIS — R53.1 WEAKNESS GENERALIZED: ICD-10-CM

## 2025-07-10 DIAGNOSIS — K92.0 HEMATEMESIS, UNSPECIFIED WHETHER NAUSEA PRESENT: ICD-10-CM

## 2025-07-10 DIAGNOSIS — K62.5 BRBPR (BRIGHT RED BLOOD PER RECTUM): ICD-10-CM

## 2025-07-10 LAB
25(OH)D3+25(OH)D2 SERPL-MCNC: 12 NG/ML (ref 30–80)
ALBUMIN SERPL-MCNC: 4.5 G/DL (ref 3.4–5)
ALBUMIN/GLOB SERPL: 1.1 RATIO
ALP SERPL-CCNC: 83 UNIT/L (ref 50–144)
ALT SERPL-CCNC: 48 UNIT/L (ref 1–45)
ANION GAP SERPL CALC-SCNC: 13 MEQ/L (ref 2–13)
AST SERPL-CCNC: 285 UNIT/L (ref 17–59)
BASOPHILS # BLD AUTO: 0.04 X10(3)/MCL (ref 0.01–0.08)
BASOPHILS NFR BLD AUTO: 0.6 % (ref 0.1–1.2)
BILIRUB SERPL-MCNC: 1 MG/DL (ref 0–1)
BUN SERPL-MCNC: 5 MG/DL (ref 7–20)
CALCIUM SERPL-MCNC: 9.1 MG/DL (ref 8.4–10.2)
CHLORIDE SERPL-SCNC: 100 MMOL/L (ref 98–110)
CO2 SERPL-SCNC: 32 MMOL/L (ref 21–32)
CREAT SERPL-MCNC: 0.38 MG/DL (ref 0.66–1.25)
CREAT/UREA NIT SERPL: 13 (ref 12–20)
EOSINOPHIL # BLD AUTO: 0.01 X10(3)/MCL (ref 0.04–0.54)
EOSINOPHIL NFR BLD AUTO: 0.1 % (ref 0.7–7)
ERYTHROCYTE [DISTWIDTH] IN BLOOD BY AUTOMATED COUNT: 19.1 %
EST. AVERAGE GLUCOSE BLD GHB EST-MCNC: 111.2 MG/DL (ref 70–115)
FERRITIN SERPL-MCNC: 220 NG/ML (ref 17.9–464)
FOLATE SERPL-MCNC: 6.1 NG/ML (ref 2.8–20)
GFR SERPLBLD CREATININE-BSD FMLA CKD-EPI: >90 ML/MIN/1.73/M2
GLOBULIN SER-MCNC: 4.2 GM/DL (ref 2–3.9)
GLUCOSE SERPL-MCNC: 122 MG/DL (ref 70–115)
HBA1C MFR BLD: 5.5 % (ref 4–6)
HCT VFR BLD AUTO: 32.3 % (ref 36–52)
HGB BLD-MCNC: 11.4 G/DL (ref 13–18)
IMM GRANULOCYTES # BLD AUTO: 0.1 X10(3)/MCL (ref 0–0.03)
IMM GRANULOCYTES NFR BLD AUTO: 1.4 % (ref 0–0.5)
IRON SATN MFR SERPL: 29 % (ref 20–50)
IRON SERPL-MCNC: 88 UG/DL (ref 65–175)
LYMPHOCYTES # BLD AUTO: 1.71 X10(3)/MCL (ref 1.32–3.57)
LYMPHOCYTES NFR BLD AUTO: 24.1 % (ref 20–55)
MCH RBC QN AUTO: 26.3 PG (ref 27–34)
MCHC RBC AUTO-ENTMCNC: 35.3 G/DL (ref 31–37)
MCV RBC AUTO: 74.4 FL (ref 79–99)
MONOCYTES # BLD AUTO: 1.32 X10(3)/MCL (ref 0.3–0.82)
MONOCYTES NFR BLD AUTO: 18.6 % (ref 4.7–12.5)
NEUTROPHILS # BLD AUTO: 3.93 X10(3)/MCL (ref 1.78–5.38)
NEUTROPHILS NFR BLD AUTO: 55.2 % (ref 37–73)
NRBC BLD AUTO-RTO: 0 %
PLATELET # BLD AUTO: 177 X10(3)/MCL (ref 140–371)
PMV BLD AUTO: 10.4 FL (ref 9.4–12.4)
POTASSIUM SERPL-SCNC: 2.6 MMOL/L (ref 3.5–5.1)
PROT SERPL-MCNC: 8.7 GM/DL (ref 6.3–8.2)
RBC # BLD AUTO: 4.34 X10(6)/MCL (ref 4–6)
SODIUM SERPL-SCNC: 145 MMOL/L (ref 136–145)
TIBC SERPL-MCNC: 211 UG/DL (ref 60–240)
TIBC SERPL-MCNC: 299 UG/DL (ref 250–450)
TRANSFERRIN SERPL-MCNC: 263 MG/DL (ref 174–364)
TSH SERPL-ACNC: 4.22 UIU/ML (ref 0.36–3.74)
VIT B12 SERPL-MCNC: 894 PG/ML (ref 211–946)
WBC # BLD AUTO: 7.11 X10(3)/MCL (ref 4–11.5)

## 2025-07-10 PROCEDURE — 85025 COMPLETE CBC W/AUTO DIFF WBC: CPT | Performed by: NURSE PRACTITIONER

## 2025-07-10 PROCEDURE — 83036 HEMOGLOBIN GLYCOSYLATED A1C: CPT | Performed by: NURSE PRACTITIONER

## 2025-07-10 PROCEDURE — 3077F SYST BP >= 140 MM HG: CPT | Mod: CPTII,,, | Performed by: NURSE PRACTITIONER

## 2025-07-10 PROCEDURE — 1160F RVW MEDS BY RX/DR IN RCRD: CPT | Mod: CPTII,,, | Performed by: NURSE PRACTITIONER

## 2025-07-10 PROCEDURE — 82607 VITAMIN B-12: CPT | Performed by: NURSE PRACTITIONER

## 2025-07-10 PROCEDURE — 82728 ASSAY OF FERRITIN: CPT | Performed by: NURSE PRACTITIONER

## 2025-07-10 PROCEDURE — 4010F ACE/ARB THERAPY RXD/TAKEN: CPT | Mod: CPTII,,, | Performed by: NURSE PRACTITIONER

## 2025-07-10 PROCEDURE — 99215 OFFICE O/P EST HI 40 MIN: CPT | Mod: ,,, | Performed by: NURSE PRACTITIONER

## 2025-07-10 PROCEDURE — 3044F HG A1C LEVEL LT 7.0%: CPT | Mod: CPTII,,, | Performed by: NURSE PRACTITIONER

## 2025-07-10 PROCEDURE — 3008F BODY MASS INDEX DOCD: CPT | Mod: CPTII,,, | Performed by: NURSE PRACTITIONER

## 2025-07-10 PROCEDURE — 84443 ASSAY THYROID STIM HORMONE: CPT | Performed by: NURSE PRACTITIONER

## 2025-07-10 PROCEDURE — 82306 VITAMIN D 25 HYDROXY: CPT | Performed by: NURSE PRACTITIONER

## 2025-07-10 PROCEDURE — G2211 COMPLEX E/M VISIT ADD ON: HCPCS | Mod: ,,, | Performed by: NURSE PRACTITIONER

## 2025-07-10 PROCEDURE — 83540 ASSAY OF IRON: CPT | Performed by: NURSE PRACTITIONER

## 2025-07-10 PROCEDURE — 1159F MED LIST DOCD IN RCRD: CPT | Mod: CPTII,,, | Performed by: NURSE PRACTITIONER

## 2025-07-10 PROCEDURE — 82746 ASSAY OF FOLIC ACID SERUM: CPT | Performed by: NURSE PRACTITIONER

## 2025-07-10 PROCEDURE — 3080F DIAST BP >= 90 MM HG: CPT | Mod: CPTII,,, | Performed by: NURSE PRACTITIONER

## 2025-07-10 PROCEDURE — 80053 COMPREHEN METABOLIC PANEL: CPT | Performed by: NURSE PRACTITIONER

## 2025-07-10 RX ORDER — VITS A,C,E/LUTEIN/MINERALS 300MCG-200
1 TABLET ORAL 2 TIMES DAILY
Qty: 60 TABLET | Refills: 11 | Status: SHIPPED | OUTPATIENT
Start: 2025-07-10

## 2025-07-10 RX ORDER — POTASSIUM CHLORIDE 20 MEQ/1
20 TABLET, EXTENDED RELEASE ORAL 2 TIMES DAILY
Qty: 30 TABLET | Refills: 1 | Status: SHIPPED | OUTPATIENT
Start: 2025-07-10

## 2025-07-10 RX ORDER — AMLODIPINE BESYLATE 5 MG/1
5 TABLET ORAL DAILY
Qty: 90 TABLET | Refills: 3 | Status: SHIPPED | OUTPATIENT
Start: 2025-07-10

## 2025-07-10 RX ORDER — DOCUSATE SODIUM 100 MG/1
100 CAPSULE, LIQUID FILLED ORAL 2 TIMES DAILY
Qty: 60 CAPSULE | Refills: 2 | Status: SHIPPED | OUTPATIENT
Start: 2025-07-10

## 2025-07-10 RX ORDER — IRBESARTAN 150 MG/1
150 TABLET ORAL DAILY
Qty: 90 TABLET | Refills: 3 | Status: SHIPPED | OUTPATIENT
Start: 2025-07-10
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
## 2025-07-11 ENCOUNTER — TELEPHONE (OUTPATIENT)
Dept: FAMILY MEDICINE | Facility: CLINIC | Age: 45
End: 2025-07-11
Payer: MEDICAID

## 2025-07-11 NOTE — TELEPHONE ENCOUNTER
----- Message from AILEEN Finch sent at 7/10/2025  4:51 PM CDT -----  Please inform patient of results:    Potassium very low, needs potassium replacement.  Sent pills to pharmacy.  If weakness worsens, advised to go to ER. Repeat labs in 7 days. Please  order BMP, Magnesium    ----- Message -----  From: Lab, Background User  Sent: 7/10/2025  11:56 AM CDT  To: AILEEN Caballero

## 2025-07-11 NOTE — LETTER
July 18, 2025    Obinna Burton  [address]  [place] [address]             [facility]  [address], [place] [address]  Phone: [phone]  Fax: [phone] Dear Mr. Burton:    We have tried several times to reach you regard your lab results.  We need for  you to call the office as soon as possible. [phone]      If you have any questions or concerns, please don't hesitate to call.    Sincerely,        Sari Cooper, ARABELLAP-C

## 2025-07-14 ENCOUNTER — PATIENT MESSAGE (OUTPATIENT)
Dept: FAMILY MEDICINE | Facility: CLINIC | Age: 45
End: 2025-07-14
Payer: MEDICAID

## 2025-07-24 ENCOUNTER — OFFICE VISIT (OUTPATIENT)
Dept: FAMILY MEDICINE | Facility: CLINIC | Age: 45
End: 2025-07-24
Payer: MEDICAID

## 2025-07-24 VITALS
WEIGHT: 170.19 LBS | BODY MASS INDEX: 27.35 KG/M2 | OXYGEN SATURATION: 96 % | HEIGHT: 66 IN | HEART RATE: 90 BPM | TEMPERATURE: 97 F | DIASTOLIC BLOOD PRESSURE: 94 MMHG | SYSTOLIC BLOOD PRESSURE: 138 MMHG

## 2025-07-24 DIAGNOSIS — E55.9 VITAMIN D DEFICIENCY: ICD-10-CM

## 2025-07-24 DIAGNOSIS — K92.1 BLOOD IN STOOL: ICD-10-CM

## 2025-07-24 DIAGNOSIS — I10 PRIMARY HYPERTENSION: ICD-10-CM

## 2025-07-24 DIAGNOSIS — E87.6 HYPOKALEMIA: Primary | ICD-10-CM

## 2025-07-24 DIAGNOSIS — R46.89 NON-COMPLIANT BEHAVIOR: ICD-10-CM

## 2025-07-24 LAB
ANION GAP SERPL CALC-SCNC: 19 MEQ/L
BUN SERPL-MCNC: 6 MG/DL (ref 8.9–20.6)
CALCIUM SERPL-MCNC: 8.9 MG/DL (ref 8.4–10.2)
CHLORIDE SERPL-SCNC: 101 MMOL/L (ref 98–107)
CO2 SERPL-SCNC: 26 MMOL/L (ref 22–29)
CREAT SERPL-MCNC: 0.45 MG/DL (ref 0.72–1.25)
CREAT/UREA NIT SERPL: 13
GFR SERPLBLD CREATININE-BSD FMLA CKD-EPI: >90 ML/MIN/1.73/M2
GLUCOSE SERPL-MCNC: 89 MG/DL (ref 74–100)
MAGNESIUM SERPL-MCNC: 1.4 MG/DL (ref 1.6–2.6)
POTASSIUM SERPL-SCNC: 3.1 MMOL/L (ref 3.5–5.1)
SODIUM SERPL-SCNC: 146 MMOL/L (ref 136–145)

## 2025-07-24 PROCEDURE — 3044F HG A1C LEVEL LT 7.0%: CPT | Mod: CPTII,,, | Performed by: NURSE PRACTITIONER

## 2025-07-24 PROCEDURE — 1159F MED LIST DOCD IN RCRD: CPT | Mod: CPTII,,, | Performed by: NURSE PRACTITIONER

## 2025-07-24 PROCEDURE — 3008F BODY MASS INDEX DOCD: CPT | Mod: CPTII,,, | Performed by: NURSE PRACTITIONER

## 2025-07-24 PROCEDURE — 99213 OFFICE O/P EST LOW 20 MIN: CPT | Mod: ,,, | Performed by: NURSE PRACTITIONER

## 2025-07-24 PROCEDURE — 1160F RVW MEDS BY RX/DR IN RCRD: CPT | Mod: CPTII,,, | Performed by: NURSE PRACTITIONER

## 2025-07-24 PROCEDURE — G2211 COMPLEX E/M VISIT ADD ON: HCPCS | Mod: ,,, | Performed by: NURSE PRACTITIONER

## 2025-07-24 PROCEDURE — 3080F DIAST BP >= 90 MM HG: CPT | Mod: CPTII,,, | Performed by: NURSE PRACTITIONER

## 2025-07-24 PROCEDURE — 83735 ASSAY OF MAGNESIUM: CPT | Performed by: NURSE PRACTITIONER

## 2025-07-24 PROCEDURE — 80048 BASIC METABOLIC PNL TOTAL CA: CPT | Performed by: NURSE PRACTITIONER

## 2025-07-24 PROCEDURE — 4010F ACE/ARB THERAPY RXD/TAKEN: CPT | Mod: CPTII,,, | Performed by: NURSE PRACTITIONER

## 2025-07-24 PROCEDURE — 3075F SYST BP GE 130 - 139MM HG: CPT | Mod: CPTII,,, | Performed by: NURSE PRACTITIONER

## 2025-07-24 RX ORDER — CHOLECALCIFEROL (VITAMIN D3) 1250 MCG
1 TABLET ORAL
Qty: 12 TABLET | Refills: 3 | Status: SHIPPED | OUTPATIENT
Start: 2025-07-24

## 2025-07-24 NOTE — PROGRESS NOTES
Patient ID: [id number]     Chief Complaint: Results      Subjective     Obinna Burton is a 44 y.o. male in the office for Results      History of Present Illness    CHIEF COMPLAINT:  Patient presents today for follow up of lab results and weakness.    ELECTROLYTE ABNORMALITIES AND WEAKNESS:  He reports persistent low potassium levels despite oral potassium supplementation, accompanied by weakness. His vitamin D levels are also low.    GI SYMPTOMS:  He reports ongoing blood in stool with persistent localized abdominal pain. He experiences vomiting with white and yellow substance, appearing to be mucus or bile. He notes some improvement with proton pump inhibitor therapy, with reduced frequency of vomiting. He was referred to a gastroenterologist but denies having an upcoming appointment.     MUSCULOSKELETAL:  He reports bilateral foot swelling following an injury when stepping in a hole. He also experiences leg pain, though specific characteristics are not described.    MEDICATION COMPLIANCE:  He demonstrates poor medication compliance, missing his blood pressure medication today and expressing uncertainty about taking previously prescribed stool softeners.    SUBSTANCE USE:  He reports daily alcohol use for pain management due to being bedridden for the past month, despite specialist recommendation to discontinue alcohol consumption.      ROS:  ROS as indicated in HPI.         Past Medical History:  has a past medical history of Allergy, Anxiety, Daily consumption of alcohol, Elevated liver enzymes, Epilepsy, unspecified, not intractable, with status epilepticus, GERD (gastroesophageal reflux disease), H/O hemophilia B, Hepatitis C virus infection resolved after antiviral drug therapy, Hypertension, Insomnia, Non-compliant behavior, and Obesity, unspecified.    Social History:  reports that he has been smoking cigarettes. He started smoking about 31 years ago. He has a 31.6 pack-year smoking history. He has been  "exposed to tobacco smoke. He has never used smokeless tobacco. He reports current alcohol use of about 10.0 standard drinks of alcohol per week. He reports current drug use. Frequency: 3.00 times per week. Drug: Marijuana.    Current Outpatient Medications   Medication Instructions    amLODIPine (NORVASC) 5 mg, Oral, Daily    cholecalciferol, vitamin D3, 1,250 mcg (50,000 unit) Tab 1 tablet, Oral, Every 7 days    docusate sodium (COLACE) 100 mg, Oral, 2 times daily    factor IX rec, albumin fusion (IDELVION) 3,500 (+/-) unit SolR 8,000 Units, Every 10 days    irbesartan (AVAPRO) 150 mg, Oral, Daily    levETIRAcetam (KEPPRA) 1,000 mg, Oral, 2 times daily    magnesium oxide 200 mg magnesium Tab 1 tablet, Oral, 2 times daily    pantoprazole (PROTONIX) 40 mg, Oral, Daily    potassium chloride SA (K-DUR,KLOR-CON) 20 MEQ tablet 20 mEq, Oral, 2 times daily       Patient is allergic to aspirin and nsaids (non-steroidal anti-inflammatory drug).     Patient Care Team:  Sari Cooper FNP-C as PCP - General (Family Medicine)  Laura Rosales MD as Consulting Physician (Hematology and Oncology)     Objective     Visit Vitals  BP (!) 138/94 (BP Location: Right arm, Patient Position: Sitting)   Pulse 90   Temp 97.4 °F (36.3 °C) (Temporal)   Ht 5' 5.98" (1.676 m)   Wt 77.2 kg (170 lb 3.2 oz)   SpO2 96%   BMI 27.48 kg/m²       Physical Exam  Vitals reviewed.   Constitutional:       General: He is not in acute distress.     Appearance: Normal appearance. He is ill-appearing. He is not toxic-appearing.   HENT:      Mouth/Throat:      Mouth: Mucous membranes are moist.   Cardiovascular:      Rate and Rhythm: Normal rate and regular rhythm.   Pulmonary:      Effort: Pulmonary effort is normal. No respiratory distress.      Breath sounds: Normal breath sounds.   Musculoskeletal:      Right knee: Deformity present. Decreased range of motion.      Left knee: Swelling, deformity and effusion present. Decreased range of motion.      " Right lower leg: No edema.      Left lower leg: No edema.   Skin:     General: Skin is warm and dry.   Neurological:      Mental Status: He is alert and oriented to person, place, and time.   Psychiatric:         Mood and Affect: Mood normal.         Behavior: Behavior normal.         Assessment & Plan     1. Hypokalemia  -     Magnesium; Future; Expected date: 08/21/2025  -     Basic Metabolic Panel; Future; Expected date: 07/24/2025    2. Vitamin D deficiency  Assessment & Plan:  Start weekly vitamin d.      3. Primary hypertension  Overview:  On amlodipine 5 mg, irbesartan 150 mg daily.       4. Blood in stool    5. Non-compliant behavior    Other orders  -     cholecalciferol, vitamin D3, 1,250 mcg (50,000 unit) Tab; Take 1 tablet by mouth every 7 days.  Dispense: 12 tablet; Refill: 3         Assessment & Plan    E87.6 Hypokalemia  E55.9 Vitamin D deficiency  I10 Primary hypertension  K92.1 Blood in stool  R46.89 Non-compliant behavior    IMPRESSION:  - Assessed lab results, noting low potassium and vitamin D levels.  - Considered low magnesium as potential cause for persistent low potassium.  - Evaluated symptoms including weakness, foot swelling, abdominal pain, vomiting, and leg pain.  - Ruled out low blood counts and B12 deficiency as causes for symptoms.  - Noted elevated BP, likely due to missed medication dose.  - Considered alcohol use as potential contributing factor to symptoms.    E87.6 HYPOKALEMIA:  - Continue potassium supplementation.  - Ordered potassium and magnesium levels.  - Contact the office either this afternoon or tomorrow regarding magnesium and potassium management.    E55.9 VITAMIN D DEFICIENCY:  - Started vitamin D supplement once weekly.    I10 PRIMARY HYPERTENSION:  - Continue BP medication as prescribed.    K92.1 BLOOD IN STOOL:  - Continue proton pump inhibitor for gastric symptoms.  - Referred to Gastroenterology for evaluation.  - Their office will contact patient to schedule an  appointment.  - Staff member (Lori) to follow up on scheduling.    R46.89 NON-COMPLIANT BEHAVIOR:  - Patient to answer all unknown phone calls to ensure scheduling of gastroenterology appointment and improve communication with healthcare team.          Follow up for 1), 2 wk, BP, hypokalemia. In addition to their next scheduled appointment, the patient has also been instructed to follow up on as needed basis.     Future Appointments   Date Time Provider Department Center   8/4/2025  1:30 PM Sari Cooper FNP-C [facility]        This note was generated with the assistance of ambient listening technology. Verbal consent was obtained by the patient and accompanying visitor(s) for the recording of patient appointment to facilitate this note. I attest to having reviewed and edited the generated note for accuracy, though some syntax or spelling errors may persist. Please contact the author of this note for any clarification.

## 2025-07-25 ENCOUNTER — TELEPHONE (OUTPATIENT)
Dept: FAMILY MEDICINE | Facility: CLINIC | Age: 45
End: 2025-07-25
Payer: MEDICAID

## 2025-07-25 NOTE — TELEPHONE ENCOUNTER
----- Message from AILEEN Finch sent at 7/25/2025 10:03 AM CDT -----  Please inform patient of results:    Low potassium and magnesium.  Sent new prescriptions for both.  Needs to restart and recheck levels before next visit.    ----- Message -----  From: Lab, Background User  Sent: 7/24/2025   3:38 PM CDT  To: AILEEN Caballero

## 2025-07-25 NOTE — TELEPHONE ENCOUNTER
Tried to call patient and tell him about already scheduled gastro appointment at the gastro clinic on Aug 25th.  No answer.  [facility] phone number [phone]

## 2025-08-04 ENCOUNTER — OFFICE VISIT (OUTPATIENT)
Dept: FAMILY MEDICINE | Facility: CLINIC | Age: 45
End: 2025-08-04
Payer: MEDICAID

## 2025-08-04 VITALS
BODY MASS INDEX: 25.39 KG/M2 | SYSTOLIC BLOOD PRESSURE: 132 MMHG | TEMPERATURE: 97 F | HEART RATE: 102 BPM | DIASTOLIC BLOOD PRESSURE: 82 MMHG | WEIGHT: 158 LBS | OXYGEN SATURATION: 97 % | HEIGHT: 66 IN

## 2025-08-04 DIAGNOSIS — G40.909 SEIZURE DISORDER: ICD-10-CM

## 2025-08-04 DIAGNOSIS — R46.89 NON-COMPLIANT BEHAVIOR: ICD-10-CM

## 2025-08-04 DIAGNOSIS — K92.1 BLOOD IN STOOL: ICD-10-CM

## 2025-08-04 DIAGNOSIS — E83.42 HYPOMAGNESEMIA: ICD-10-CM

## 2025-08-04 DIAGNOSIS — K59.00 CONSTIPATION, UNSPECIFIED CONSTIPATION TYPE: ICD-10-CM

## 2025-08-04 DIAGNOSIS — E87.6 HYPOKALEMIA: ICD-10-CM

## 2025-08-04 DIAGNOSIS — I10 PRIMARY HYPERTENSION: Primary | ICD-10-CM

## 2025-08-04 DIAGNOSIS — K92.0 HEMATEMESIS, UNSPECIFIED WHETHER NAUSEA PRESENT: ICD-10-CM

## 2025-08-04 DIAGNOSIS — D67 HEMOPHILIA B: ICD-10-CM

## 2025-08-04 PROCEDURE — 3075F SYST BP GE 130 - 139MM HG: CPT | Mod: CPTII,,, | Performed by: NURSE PRACTITIONER

## 2025-08-04 PROCEDURE — 1160F RVW MEDS BY RX/DR IN RCRD: CPT | Mod: CPTII,,, | Performed by: NURSE PRACTITIONER

## 2025-08-04 PROCEDURE — 4010F ACE/ARB THERAPY RXD/TAKEN: CPT | Mod: CPTII,,, | Performed by: NURSE PRACTITIONER

## 2025-08-04 PROCEDURE — G2211 COMPLEX E/M VISIT ADD ON: HCPCS | Mod: ,,, | Performed by: NURSE PRACTITIONER

## 2025-08-04 PROCEDURE — 3079F DIAST BP 80-89 MM HG: CPT | Mod: CPTII,,, | Performed by: NURSE PRACTITIONER

## 2025-08-04 PROCEDURE — 3008F BODY MASS INDEX DOCD: CPT | Mod: CPTII,,, | Performed by: NURSE PRACTITIONER

## 2025-08-04 PROCEDURE — 3044F HG A1C LEVEL LT 7.0%: CPT | Mod: CPTII,,, | Performed by: NURSE PRACTITIONER

## 2025-08-04 PROCEDURE — 99213 OFFICE O/P EST LOW 20 MIN: CPT | Mod: ,,, | Performed by: NURSE PRACTITIONER

## 2025-08-04 PROCEDURE — 1159F MED LIST DOCD IN RCRD: CPT | Mod: CPTII,,, | Performed by: NURSE PRACTITIONER

## 2025-08-04 RX ORDER — LEVETIRACETAM 500 MG/1
1000 TABLET ORAL 2 TIMES DAILY
Qty: 360 TABLET | Refills: 0 | Status: SHIPPED | OUTPATIENT
Start: 2025-08-04

## 2025-08-04 RX ORDER — COAGULATION FACTOR IX (RECOMBINANT) 3000 UNIT
KIT INTRAVENOUS
COMMUNITY
Start: 2025-07-02

## 2025-08-04 RX ORDER — LANOLIN ALCOHOL/MO/W.PET/CERES
400 CREAM (GRAM) TOPICAL DAILY
Qty: 30 TABLET | Refills: 0 | Status: SHIPPED | OUTPATIENT
Start: 2025-08-04

## 2025-08-04 RX ORDER — IRBESARTAN 150 MG/1
150 TABLET ORAL DAILY
Qty: 90 TABLET | Refills: 0 | Status: SHIPPED | OUTPATIENT
Start: 2025-08-04

## 2025-08-04 RX ORDER — COAGULATION FACTOR IX (RECOMBINANT) 1000 UNIT
KIT INTRAVENOUS
COMMUNITY
Start: 2025-07-02

## 2025-08-04 RX ORDER — PANTOPRAZOLE SODIUM 40 MG/1
40 TABLET, DELAYED RELEASE ORAL DAILY
Qty: 60 TABLET | Refills: 1 | Status: SHIPPED | OUTPATIENT
Start: 2025-08-04

## 2025-08-04 RX ORDER — DOCUSATE SODIUM 100 MG/1
100 CAPSULE, LIQUID FILLED ORAL 2 TIMES DAILY
Qty: 60 CAPSULE | Refills: 2 | Status: SHIPPED | OUTPATIENT
Start: 2025-08-04

## 2025-08-04 RX ORDER — AMLODIPINE BESYLATE 5 MG/1
5 TABLET ORAL DAILY
Qty: 90 TABLET | Refills: 0 | Status: SHIPPED | OUTPATIENT
Start: 2025-08-04

## 2025-08-04 NOTE — PROGRESS NOTES
"Patient ID: [id number]     Chief Complaint: Hypokalemia and Hypertension      Subjective     Obinna Burton is a 44 y.o. male in the office for Hypokalemia and Hypertension       History of Present Illness    CHIEF COMPLAINT:  Patient presents today for follow up of low magnesium and potassium levels.    GASTROINTESTINAL:  He reports ongoing episodes of blood in stool and hematemesis. He has an upcoming gastroenterology appointment that has been anticipated for months if not years. His mother is aware of the date and time.      CURRENT MEDICATIONS:  He takes two blood pressure medications, Protonix, Keppra for seizures, and a stool softener. He takes potassium supplements, typically one tablet daily, occasionally twice daily due to heat. He reports not receiving prescribed magnesium as the pharmacy did not provide the medication. He did not notify anyone that he didn't receive the medication.      He does not monitor his blood pressure at home but reports compliance over the last two weeks. Admitted to buying "black market Xanax."     ROS:  ROS as indicated in HPI.         Past Medical History:  has a past medical history of Allergy, Anxiety, Daily consumption of alcohol, Elevated liver enzymes, Epilepsy, unspecified, not intractable, with status epilepticus, GERD (gastroesophageal reflux disease), H/O hemophilia B, Hepatitis C virus infection resolved after antiviral drug therapy, Hypertension, Insomnia, Non-compliant behavior, and Obesity, unspecified.    Social History:  reports that he has been smoking cigarettes. He started smoking about 31 years ago. He has a 31.6 pack-year smoking history. He has been exposed to tobacco smoke. He has never used smokeless tobacco. He reports current alcohol use of about 10.0 standard drinks of alcohol per week. He reports current drug use. Frequency: 3.00 times per week. Drug: Marijuana.    Current Outpatient Medications   Medication Instructions    amLODIPine (NORVASC) 5 mg, " "Oral, Daily    BENEFIX 1,000 unit injection Inject into the vein.    BENEFIX 3,000 unit injection Inject into the vein.    cholecalciferol, vitamin D3, 1,250 mcg (50,000 unit) Tab 1 tablet, Oral, Every 7 days    docusate sodium (COLACE) 100 mg, Oral, 2 times daily    factor IX rec, albumin fusion (IDELVION) 3,500 (+/-) unit SolR 8,000 Units, Every 10 days    irbesartan (AVAPRO) 150 mg, Oral, Daily    levETIRAcetam (KEPPRA) 1,000 mg, Oral, 2 times daily    magnesium glycinate 100 mg Tab 2 tablets, Oral, 2 times daily    pantoprazole (PROTONIX) 40 mg, Oral, Daily    potassium chloride SA (K-DUR,KLOR-CON) 20 MEQ tablet 20 mEq, Oral, 2 times daily       Patient is allergic to aspirin and nsaids (non-steroidal anti-inflammatory drug).     Patient Care Team:  Sari Cooper FNP-C as PCP - General (Family Medicine)  Laura Rosales MD as Consulting Physician (Hematology and Oncology)     Objective     Visit Vitals  /82 (BP Location: Right arm)   Pulse 102   Temp 97.2 °F (36.2 °C) (Temporal)   Ht 5' 5.98" (1.676 m)   Wt 71.7 kg (158 lb)   SpO2 97%   BMI 25.52 kg/m²       Physical Exam  Vitals reviewed.   Constitutional:       General: He is not in acute distress.     Appearance: Normal appearance. He is not toxic-appearing.   HENT:      Mouth/Throat:      Mouth: Mucous membranes are moist.   Cardiovascular:      Rate and Rhythm: Normal rate and regular rhythm.   Pulmonary:      Effort: Pulmonary effort is normal. No respiratory distress.      Breath sounds: Normal breath sounds.   Musculoskeletal:      Right knee: Deformity present. Decreased range of motion.      Left knee: Swelling, deformity and effusion present. Decreased range of motion.      Right lower leg: No edema.      Left lower leg: No edema.   Skin:     General: Skin is warm and dry.   Neurological:      Mental Status: He is alert and oriented to person, place, and time.   Psychiatric:         Mood and Affect: Mood normal.         Behavior: " Behavior normal.         Assessment & Plan     1. Primary hypertension  Overview:  On amlodipine 5 mg, irbesartan 150 mg daily.     Orders:  -     irbesartan (AVAPRO) 150 MG tablet; Take 1 tablet (150 mg total) by mouth once daily.  Dispense: 90 tablet; Refill: 0  -     amLODIPine (NORVASC) 5 MG tablet; Take 1 tablet (5 mg total) by mouth once daily.  Dispense: 90 tablet; Refill: 0    2. Hypomagnesemia    3. Hypokalemia    4. Hematemesis, unspecified whether nausea present  Overview:  Presented on 11/5/24 to [facility] with hematemesis for several days.  Transferred to [facility] where EGD showed severe esophagitis, circumferential, ~10cm of distal esophagus. Multiple points with adherent fibrin/clot that did not remove with lavage. No active bleeding. PPI BID continued.  Will need repeat endoscopy 4-6 weeks.  He needs a referral to another GI since he can no longer return to [facility] after he became violent with staff upon wakening from anesthesia.        5. Hemophilia B    6. Seizure disorder  Overview:  On keppra 1000 mg bid.  Referred to neurology 5/2023.  Scheduled with Dr. Martins on 10/19/23 and no showed.     Orders:  -     levETIRAcetam (KEPPRA) 500 MG Tab; Take 2 tablets (1,000 mg total) by mouth 2 (two) times daily.  Dispense: 360 tablet; Refill: 0    7. Non-compliant behavior    8. Constipation, unspecified constipation type    9. Blood in stool    Other orders  -     pantoprazole (PROTONIX) 40 MG tablet; Take 1 tablet (40 mg total) by mouth once daily.  Dispense: 60 tablet; Refill: 1  -     docusate sodium (COLACE) 100 MG capsule; Take 1 capsule (100 mg total) by mouth 2 (two) times daily.  Dispense: 60 capsule; Refill: 2         Assessment & Plan    D67 Hemophilia B  G40.909 Seizure disorder  I10 Primary hypertension  E83.42 Hypomagnesemia  E87.6 Hypokalemia  K92.0 Hematemesis, unspecified whether nausea present  R46.89 Non-compliant behavior  K59.00 Constipation, unspecified constipation type  K92.1 Blood in  stool    IMPRESSION:  - Reviewed previous labs showing low magnesium and potassium levels.  - BP appeared adequate during visit.  - Ongoing episodes of blood in stool and vomiting blood.    G40.909 SEIZURE DISORDER:  - Continued Keppra.    I10 PRIMARY HYPERTENSION:  - Continued two BP medications.    E83.42 HYPOMAGNESEMIA:  - Restarted magnesium supplementation, ensuring prescription is filled at pharmacy.  - Ordered labs to recheck magnesium levels.    E87.6 HYPOKALEMIA:  - Continued potassium supplementation, considering dose increase pending lab results.  - Ordered labs to recheck potassium levels.    K92.0 HEMATEMESIS, UNSPECIFIED WHETHER NAUSEA PRESENT:  - Continued Protonix.  - Referred to gastroenterologist for evaluation of vomiting blood.    K59.00 CONSTIPATION, UNSPECIFIED CONSTIPATION TYPE:  - Continued stool softener, provided refill for 30-day supply.    K92.1 BLOOD IN STOOL:  - Referred to gastroenterologist for evaluation of blood in stool.   Discussed rechecking his potassium and magnesium today but he refused. Magnesium changed and advised to let me know if he doesn't receive the prescription.    He was informed that today would be our last visit since he had violated our no show policy and had years of ongoing noncompliant behavior.  I offered to help him stop drinking on numerous occasions and he was not willing to stop. He was encouraged not to miss is upcoming GI appointment.     This note was generated with the assistance of ambient listening technology. Verbal consent was obtained by the patient and accompanying visitor(s) for the recording of patient appointment to facilitate this note. I attest to having reviewed and edited the generated note for accuracy, though some syntax or spelling errors may persist. Please contact the author of this note for any clarification.

## 2025-09-01 PROBLEM — R11.2 INTRACTABLE NAUSEA AND VOMITING: Status: ACTIVE | Noted: 2025-09-01

## 2025-09-01 PROBLEM — F17.200 TOBACCO DEPENDENCY: Status: ACTIVE | Noted: 2025-09-01

## 2025-09-04 ENCOUNTER — ANESTHESIA EVENT (OUTPATIENT)
Dept: ENDOSCOPY | Facility: HOSPITAL | Age: 45
End: 2025-09-04
Payer: MEDICAID

## 2025-09-04 ENCOUNTER — ANESTHESIA (OUTPATIENT)
Dept: ENDOSCOPY | Facility: HOSPITAL | Age: 45
End: 2025-09-04
Payer: MEDICAID

## 2025-09-04 PROCEDURE — 63600175 PHARM REV CODE 636 W HCPCS: Performed by: STUDENT IN AN ORGANIZED HEALTH CARE EDUCATION/TRAINING PROGRAM

## 2025-09-04 PROCEDURE — 25000003 PHARM REV CODE 250: Performed by: STUDENT IN AN ORGANIZED HEALTH CARE EDUCATION/TRAINING PROGRAM

## 2025-09-04 RX ORDER — LIDOCAINE HYDROCHLORIDE 20 MG/ML
INJECTION INTRAVENOUS
Status: DISCONTINUED | OUTPATIENT
Start: 2025-09-04 | End: 2025-09-04

## 2025-09-04 RX ORDER — PROPOFOL 10 MG/ML
INJECTION, EMULSION INTRAVENOUS
Status: DISCONTINUED | OUTPATIENT
Start: 2025-09-04 | End: 2025-09-04

## 2025-09-04 RX ORDER — GLYCOPYRROLATE 0.2 MG/ML
INJECTION INTRAMUSCULAR; INTRAVENOUS
Status: DISCONTINUED | OUTPATIENT
Start: 2025-09-04 | End: 2025-09-04

## 2025-09-04 RX ORDER — PROPOFOL 10 MG/ML
VIAL (ML) INTRAVENOUS CONTINUOUS PRN
Status: DISCONTINUED | OUTPATIENT
Start: 2025-09-04 | End: 2025-09-04

## 2025-09-04 RX ADMIN — LIDOCAINE HYDROCHLORIDE 100 MG: 20 INJECTION INTRAVENOUS at 01:09

## 2025-09-04 RX ADMIN — PROPOFOL 80 MG: 10 INJECTION, EMULSION INTRAVENOUS at 01:09

## 2025-09-04 RX ADMIN — PROPOFOL 175 MCG/KG/MIN: 10 INJECTION, EMULSION INTRAVENOUS at 01:09

## 2025-09-04 RX ADMIN — GLYCOPYRROLATE 0.1 MG: 0.2 INJECTION INTRAMUSCULAR; INTRAVENOUS at 01:09

## 2025-09-04 RX ADMIN — SODIUM CHLORIDE: 9 INJECTION, SOLUTION INTRAVENOUS at 01:09
